# Patient Record
Sex: FEMALE | Race: WHITE | NOT HISPANIC OR LATINO | Employment: UNEMPLOYED | URBAN - NONMETROPOLITAN AREA
[De-identification: names, ages, dates, MRNs, and addresses within clinical notes are randomized per-mention and may not be internally consistent; named-entity substitution may affect disease eponyms.]

---

## 2017-07-24 PROBLEM — K21.9 ACID REFLUX: Status: ACTIVE | Noted: 2017-07-24

## 2018-01-04 PROBLEM — R10.32 LEFT LOWER QUADRANT PAIN: Status: ACTIVE | Noted: 2018-01-04

## 2018-03-01 PROBLEM — R10.32 LEFT LOWER QUADRANT PAIN: Status: RESOLVED | Noted: 2018-01-04 | Resolved: 2018-03-01

## 2019-11-22 PROBLEM — K21.9 GASTROESOPHAGEAL REFLUX DISEASE: Status: ACTIVE | Noted: 2019-11-22

## 2020-12-01 ENCOUNTER — TELEPHONE (OUTPATIENT)
Dept: INTERNAL MEDICINE | Facility: CLINIC | Age: 25
End: 2020-12-01

## 2020-12-01 NOTE — TELEPHONE ENCOUNTER
Left pt a detailed message that record is on [facility] for her to print off. Also mailed a copy to her home address.

## 2020-12-01 NOTE — TELEPHONE ENCOUNTER
PATIENT IS NEEDING A COPY OF HER VACCINATION RECORDS FOR [facility]SING [facility]OL.     CONTACT: [phone]

## 2023-04-28 ENCOUNTER — OFFICE VISIT (OUTPATIENT)
Dept: INTERNAL MEDICINE | Facility: CLINIC | Age: 28
End: 2023-04-28
Payer: COMMERCIAL

## 2023-04-28 VITALS
BODY MASS INDEX: 20.74 KG/M2 | OXYGEN SATURATION: 98 % | HEART RATE: 86 BPM | WEIGHT: 132.12 LBS | TEMPERATURE: 97.9 F | SYSTOLIC BLOOD PRESSURE: 102 MMHG | HEIGHT: 67 IN | DIASTOLIC BLOOD PRESSURE: 64 MMHG | RESPIRATION RATE: 16 BRPM

## 2023-04-28 DIAGNOSIS — N63.25 MASS OVERLAPPING MULTIPLE QUADRANTS OF LEFT BREAST: ICD-10-CM

## 2023-04-28 DIAGNOSIS — F41.9 ANXIETY: Primary | ICD-10-CM

## 2023-04-28 PROCEDURE — 99214 OFFICE O/P EST MOD 30 MIN: CPT | Performed by: FAMILY MEDICINE

## 2023-04-28 RX ORDER — LEVONORGESTREL 52 MG/1
INTRAUTERINE DEVICE INTRAUTERINE
COMMUNITY
Start: 2023-01-03

## 2023-04-28 RX ORDER — VENLAFAXINE HYDROCHLORIDE 37.5 MG/1
37.5 CAPSULE, EXTENDED RELEASE ORAL DAILY
Qty: 30 CAPSULE | Refills: 1 | Status: SHIPPED | OUTPATIENT
Start: 2023-04-28

## 2023-04-28 NOTE — PROGRESS NOTES
"Chief Complaint  Anxiety (Follow up)  Answers for HPI/ROS submitted by the patient on 4/21/2023  Please describe your symptoms.: Anxiety  Have you had these symptoms before?: Yes  How long have you been having these symptoms?: Greater than 2 weeks  Please describe any probable cause for these symptoms. : Post partum, meds werent working  What is the primary reason for your visit?: Other      Subjective        Latanya Morgan presents to [facility]  History of Present Illness  Postpartum?  Intrusive thoughts. Hates it.  Feels like a terrible person for having these thoughts.  Has tried Prozac and Lexapro and didn't like either.  Tried going back on Prozac postpartum, maybe helped a little but not where she needed to be.     Breast mass 6:00 left breast x years  Painful at times  Desires removal      Objective   Vital Signs:  /64 (BP Location: Right arm, Patient Position: Sitting, Cuff Size: Adult)   Pulse 86   Temp 97.9 °F (36.6 °C) (Temporal)   Resp 16   Ht 170.2 cm (67\")   Wt 59.9 kg (132 lb 1.9 oz)   SpO2 98%   BMI 20.69 kg/m²   Estimated body mass index is 20.69 kg/m² as calculated from the following:    Height as of this encounter: 170.2 cm (67\").    Weight as of this encounter: 59.9 kg (132 lb 1.9 oz).       BMI is within normal parameters. No other follow-up for BMI required.      Physical Exam  Vitals and nursing note reviewed.   Constitutional:       General: She is not in acute distress.     Appearance: Normal appearance. She is well-developed, well-groomed and normal weight. She is not ill-appearing, toxic-appearing or diaphoretic.   HENT:      Head: Normocephalic and atraumatic.      Right Ear: Hearing normal.      Left Ear: Hearing normal.   Eyes:      General: Lids are normal. No scleral icterus.     Extraocular Movements: Extraocular movements intact.   Neck:      Trachea: Phonation normal.   Pulmonary:      Effort: Pulmonary effort is normal. "   Chest:       Musculoskeletal:      Cervical back: Neck supple.   Skin:     Coloration: Skin is not jaundiced or pale.   Neurological:      General: No focal deficit present.      Mental Status: She is alert and oriented to person, place, and time.      Motor: Motor function is intact.   Psychiatric:         Attention and Perception: Attention and perception normal.         Mood and Affect: Affect normal. Mood is anxious.         Speech: Speech normal.         Behavior: Behavior normal. Behavior is cooperative.         Thought Content: Thought content normal.         Cognition and Memory: Cognition and memory normal.         Judgment: Judgment normal.        Result Review :  The following data was reviewed by: Juju Mcneil MD on 04/28/2023:   breast left limited (11/16/2020 13:11)                 Assessment and Plan   Diagnoses and all orders for this visit:    1. Anxiety (Primary)  -     venlafaxine XR (Effexor XR) 37.5 MG 24 hr capsule; Take 1 capsule by mouth Daily.  Dispense: 30 capsule; Refill: 1    2. Mass overlapping multiple quadrants of left breast  -     Ambulatory Referral to Breast Surgery    desires removal of mass, has had imaging previously. If imaging required prior to scheduling per breast surgery will order ultrasound.     Stop Effexor xr if any si/hi. Return to clinic in a month for recheck.           Follow Up   Return in about 4 weeks (around 5/26/2023) for follow up.  Patient was given instructions and counseling regarding her condition or for health maintenance advice. Please see specific information pulled into the AVS if appropriate.

## 2023-05-18 ENCOUNTER — TRANSCRIBE ORDERS (OUTPATIENT)
Dept: MAMMOGRAPHY | Facility: HOSPITAL | Age: 28
End: 2023-05-18
Payer: COMMERCIAL

## 2023-05-18 DIAGNOSIS — N63.24 UNSPECIFIED LUMP IN THE LEFT BREAST, LOWER INNER QUADRANT: Primary | ICD-10-CM

## 2023-05-31 ENCOUNTER — HOSPITAL ENCOUNTER (OUTPATIENT)
Dept: ULTRASOUND IMAGING | Facility: HOSPITAL | Age: 28
Discharge: HOME | End: 2023-05-31
Admitting: SURGERY

## 2023-05-31 DIAGNOSIS — N63.24 UNSPECIFIED LUMP IN THE LEFT BREAST, LOWER INNER QUADRANT: ICD-10-CM

## 2023-05-31 PROCEDURE — 76642 ULTRASOUND BREAST LIMITED: CPT

## 2023-08-27 ENCOUNTER — PATIENT MESSAGE (OUTPATIENT)
Dept: INTERNAL MEDICINE | Facility: CLINIC | Age: 28
End: 2023-08-27
Payer: COMMERCIAL

## 2023-08-28 NOTE — TELEPHONE ENCOUNTER
From: Latanya Morgan  To: Juju Mcneil  Sent: 8/27/2023 9:26 AM EDT  Subject: Canker sores    Hello, this may be a stupid question but my mouth is hurting so bad from so many canker sores at once right now. It's so bad my bottom lip is swollen. It's made it difficult to eat and even swallow my spit. I'm just wondering if there is something I am able to take to help with these whether that be to reduce the swelling or help clear them up. I get them so often and I always try to avoid acidic foods the best I can to prevent them.   Thank you   
No respiratory distress. No stridor, Lungs sounds clear with good aeration bilaterally.

## 2023-08-29 ENCOUNTER — OFFICE VISIT (OUTPATIENT)
Dept: INTERNAL MEDICINE | Facility: CLINIC | Age: 28
End: 2023-08-29
Payer: COMMERCIAL

## 2023-08-29 VITALS
BODY MASS INDEX: 19.93 KG/M2 | TEMPERATURE: 97.5 F | HEART RATE: 68 BPM | HEIGHT: 67 IN | OXYGEN SATURATION: 100 % | SYSTOLIC BLOOD PRESSURE: 100 MMHG | WEIGHT: 127 LBS | DIASTOLIC BLOOD PRESSURE: 68 MMHG

## 2023-08-29 DIAGNOSIS — K12.0 APHTHOUS ULCER OF MOUTH: Primary | ICD-10-CM

## 2023-08-29 DIAGNOSIS — K58.2 IRRITABLE BOWEL SYNDROME WITH BOTH CONSTIPATION AND DIARRHEA: ICD-10-CM

## 2023-08-29 LAB
EXPIRATION DATE: NORMAL
EXPIRATION DATE: NORMAL
FLUAV AG UPPER RESP QL IA.RAPID: NOT DETECTED
FLUBV AG UPPER RESP QL IA.RAPID: NOT DETECTED
INTERNAL CONTROL: NORMAL
INTERNAL CONTROL: NORMAL
Lab: NORMAL
Lab: NORMAL
S PYO AG THROAT QL: NEGATIVE
SARS-COV-2 AG UPPER RESP QL IA.RAPID: NOT DETECTED

## 2023-08-29 PROCEDURE — 87880 STREP A ASSAY W/OPTIC: CPT | Performed by: STUDENT IN AN ORGANIZED HEALTH CARE EDUCATION/TRAINING PROGRAM

## 2023-08-29 PROCEDURE — 99213 OFFICE O/P EST LOW 20 MIN: CPT | Performed by: STUDENT IN AN ORGANIZED HEALTH CARE EDUCATION/TRAINING PROGRAM

## 2023-08-29 PROCEDURE — 87428 SARSCOV & INF VIR A&B AG IA: CPT | Performed by: STUDENT IN AN ORGANIZED HEALTH CARE EDUCATION/TRAINING PROGRAM

## 2023-08-29 RX ORDER — PHENOL 0.6 %
1 LIQUID (ML) MUCOUS MEMBRANE AS NEEDED
Qty: 178 ML | Refills: 0 | Status: SHIPPED | OUTPATIENT
Start: 2023-08-29

## 2023-08-29 RX ORDER — MONTELUKAST SODIUM 4 MG/1
1 TABLET, CHEWABLE ORAL 2 TIMES DAILY
Qty: 180 TABLET | Refills: 1 | Status: SHIPPED | OUTPATIENT
Start: 2023-08-29

## 2023-08-29 NOTE — PROGRESS NOTES
Subjective   Latanya Morgan is a 28 y.o. female.     History of Present Illness  Patient presents with complaints of multiple mouth lesions.  States that she gets aphthous ulcers regularly.  States this has been going on since Saturday.  Denies fever chills or throat pain.  States they are painful and makes it hard to eat.  Has had them across her lips but they have dried out on the outside.  Stress levels have been very high lately.  Also has complaints of bouts of constipation and diarrhea.  States she has been having chronic watery diarrhea since having her gallbladder removed.  However states over the past week she has been very constipated.  She has been taking MiraLAX for this.  Having watery stool with no formed stools out.    The following portions of the patient's history were reviewed and updated as appropriate: allergies, current medications, past family history, past medical history, past social history, past surgical history, and problem list.    Review of Systems   All other systems reviewed and are negative.    Objective   Physical Exam  Vitals and nursing note reviewed.   Constitutional:       Appearance: Normal appearance.   HENT:      Head: Normocephalic and atraumatic.      Right Ear: External ear normal.      Left Ear: External ear normal.      Nose: Nose normal.      Mouth/Throat:      Mouth: Mucous membranes are moist.      Pharynx: Oropharynx is clear. No oropharyngeal exudate or posterior oropharyngeal erythema.      Comments: Aphthous ulcer  Eyes:      Extraocular Movements: Extraocular movements intact.      Conjunctiva/sclera: Conjunctivae normal.      Pupils: Pupils are equal, round, and reactive to light.   Cardiovascular:      Rate and Rhythm: Normal rate and regular rhythm.      Pulses: Normal pulses.      Heart sounds: Normal heart sounds.   Pulmonary:      Effort: Pulmonary effort is normal.      Breath sounds: Normal breath sounds.   Abdominal:      General: Abdomen is  flat. Bowel sounds are normal.      Palpations: Abdomen is soft.   Musculoskeletal:         General: Normal range of motion.      Cervical back: Normal range of motion.   Skin:     General: Skin is warm.      Capillary Refill: Capillary refill takes less than 2 seconds.   Neurological:      General: No focal deficit present.      Mental Status: She is alert and oriented to person, place, and time. Mental status is at baseline.   Psychiatric:         Mood and Affect: Mood normal.         Behavior: Behavior normal.         Thought Content: Thought content normal.         Judgment: Judgment normal.       Assessment & Plan   Diagnoses and all orders for this visit:    1. Aphthous ulcer of mouth (Primary)  -     phenol (Ulcerease) 0.6-1.5 % solution; Apply 1 application  to the mouth or throat As Needed for Mucositis.  Dispense: 178 mL; Refill: 0  -     POCT rapid strep A  -     POCT SARS-CoV-2 Antigen GEORGE + Flu    2. Irritable bowel syndrome with both constipation and diarrhea  -     colestipol (COLESTID) 1 g tablet; Take 1 tablet by mouth 2 (Two) Times a Day.  Dispense: 180 tablet; Refill: 1       Ulcerease prescribed since we cannot do Magic mouthwash currently due to backorder of viscous lidocaine  Strep flu and COVID-negative  Counseled to increase her Valtrex to 1 g twice a day while she has these ulcers to help with resolution  Started colestipol for irritable bowel syndrome and postcholecystectomy syndrome

## 2023-09-01 RX ORDER — VALACYCLOVIR HYDROCHLORIDE 1 G/1
2000 TABLET, FILM COATED ORAL 2 TIMES DAILY
Qty: 12 TABLET | Refills: 5 | Status: SHIPPED | OUTPATIENT
Start: 2023-09-01

## 2023-09-27 NOTE — PROGRESS NOTES
Patient: Latanya Morgan    YOB: 1995    Date: 09/28/2023    Primary Care Provider: Juju Mcneil MD    Chief Complaint   Patient presents with    Constipation       SUBJECTIVE:    History of present illness: Patient states that she has had relative constipation for at least a month.  She has had to take laxatives as well as enemas for bowel movements.  Previous to this she had been having postprandial diarrhea especially since her cholecystectomy.  Patient states that she has had some abdominal pain historically but none recently.  Of note, patient started Effexor about 2 months ago.  She has also had stool caliber changes that has become more narrow.    The following portions of the patient's history were reviewed and updated as appropriate: allergies, current medications, past family history, past medical history, past social history, past surgical history and problem list.    Review of Systems   Constitutional:  Negative for activity change, chills, fever and unexpected weight change.   HENT:  Negative for hearing loss, trouble swallowing and voice change.    Eyes:  Negative for visual disturbance.   Respiratory:  Negative for apnea, cough, chest tightness, shortness of breath and wheezing.    Cardiovascular:  Negative for chest pain, palpitations and leg swelling.   Gastrointestinal:  Positive for constipation. Negative for abdominal distention, abdominal pain, anal bleeding, blood in stool, diarrhea, nausea, rectal pain and vomiting.   Endocrine: Negative for cold intolerance and heat intolerance.   Genitourinary:  Negative for difficulty urinating, dysuria and flank pain.   Musculoskeletal:  Negative for back pain and gait problem.   Skin:  Negative for color change, rash and wound.   Neurological:  Negative for dizziness, syncope, speech difficulty, weakness, light-headedness, numbness and headaches.   Hematological:  Negative for adenopathy. Does not bruise/bleed easily.  "  Psychiatric/Behavioral:  Negative for confusion. The patient is not nervous/anxious.      History:  Past Medical History:   Diagnosis Date    Abnormal Pap smear of cervix     Anxiety     Arthritis     Body piercing     ears and nose    Depression     Dysphagia     Patient reported \"sometimes feels like things won't go down\"    GERD (gastroesophageal reflux disease)     History of fracture     Coccyx and left arm    History of Holter monitoring 2018    states was WNL    Migraine     Seasonal allergies     Tattoos     Wears glasses        Past Surgical History:   Procedure Laterality Date    ADENOIDECTOMY  2003    APPENDECTOMY       SECTION  24    CHOLECYSTECTOMY      CHOLECYSTECTOMY WITH INTRAOPERATIVE CHOLANGIOGRAM N/A 07/10/2020    Procedure: CHOLECYSTECTOMY LAPAROSCOPIC INTRAOPERATIVE CHOLANGIOGRAM;  Surgeon: Leonor Kuhn MD;  Location: [facility] OR;  Service: General;  Laterality: N/A;    DIAGNOSTIC LAPAROSCOPY N/A 2018    Procedure: DIAGNOSTIC LAPAROSCOPY WITH EXCISION OF LEFT OVARIAN CYST, LAPAROSCOPY  APPENDECTOMY;  Surgeon: Leonor Kuhn MD;  Location: [facility] OR;  Service:     ENDOSCOPY N/A 2019    Procedure: ESOPHAGOGASTRODUODENOSCOPY;  Surgeon: Leonor Kuhn MD;  Location: [facility] ENDOSCOPY;  Service: Gastroenterology    HAND SURGERY Right 2016    thumb - reported hardware remains intact    OVARIAN CYST REMOVAL      SKIN BIOPSY      moles-benign    TONSILLECTOMY         Family History   Problem Relation Age of Onset    Migraines Mother     Anxiety disorder Mother     Hypertension Maternal Grandmother     Anxiety disorder Maternal Grandmother     Arthritis Maternal Grandfather        Social History     Tobacco Use    Smoking status: Former     Packs/day: 0.25     Years: 4.00     Pack years: 1.00     Types: Cigarettes, Electronic Cigarette     Start date:      Quit date:      Years since quittin.7    Smokeless tobacco: Never   Vaping Use    Vaping Use: Former " "  Substance Use Topics    Alcohol use: Yes     Alcohol/week: 1.0 standard drink     Types: 1 Glasses of wine per week     Comment: \"couple of beers once a week\" - reported no etoh abuse    Drug use: No       Allergies:  No Known Allergies    Medications:    Current Outpatient Medications:     Mirena, 52 MG, 20 MCG/DAY intrauterine device IUD, , Disp: , Rfl:     Polyethylene Glycol powder, Use 1 dose 3 (Three) Times a Day., Disp: , Rfl:     valACYclovir (VALTREX) 500 MG tablet, Take 1 tablet by mouth., Disp: , Rfl:     venlafaxine XR (Effexor XR) 37.5 MG 24 hr capsule, Take 1 capsule by mouth Daily., Disp: 90 capsule, Rfl: 3    OBJECTIVE:    Vital Signs:   Vitals:    09/28/23 1431   BP: 100/68   Pulse: 100   Temp: 97.5 °F (36.4 °C)   SpO2: 100%   Weight: 58.3 kg (128 lb 9.6 oz)   Height: 170.2 cm (67\")       Physical Exam:   General Appearance:    Alert, cooperative, in no acute distress   Head:    Normocephalic, without obvious abnormality, atraumatic   Eyes:            Normal.  No scleral icterus.  PERRLA    Lungs:     Clear to auscultation,respirations regular, even and                  unlabored    Heart:    Regular rhythm and normal rate, normal S1 and S2, no            murmur   Abdomen:     Normal bowel sounds, no masses, no organomegaly, soft        non-tender, non-distended, no guarding,    Extremities:   Moves all extremities well, no edema, no cyanosis, no             redness   Skin:   No bleeding, bruising or rash   Neurologic:   Normal without gross deficits.   Psychiatric: No evidence of depression or anxiety        Results Review:   None    Review of Systems was reviewed and confirmed as accurate as documented by the MA.    ASSESSMENT/PLAN:    1. Constipation, unspecified constipation type        Patient with complaint of constipation.  I will order a KUB to further evaluate.  This may be a side effect to the Effexor as this is a possible side effect and began having symptoms not long after beginning " this medication.  I will have her discuss this with her primary care and possibly stop or change this medication.  Initially I would like her to attempt this prior to proceeding with further work-up which may include a colonoscopy.  After stopping the medication I would like to give it at least a week or 2 and if symptoms still continue we can consider further work-up.  She is agreeable to that plan.    Electronically signed by Donovan Reyna MD  09/28/23 09:03 EDT

## 2023-09-28 ENCOUNTER — OFFICE VISIT (OUTPATIENT)
Dept: INTERNAL MEDICINE | Facility: CLINIC | Age: 28
End: 2023-09-28
Payer: COMMERCIAL

## 2023-09-28 ENCOUNTER — OFFICE VISIT (OUTPATIENT)
Dept: SURGERY | Facility: CLINIC | Age: 28
End: 2023-09-28
Payer: COMMERCIAL

## 2023-09-28 ENCOUNTER — HOSPITAL ENCOUNTER (OUTPATIENT)
Dept: GENERAL RADIOLOGY | Facility: HOSPITAL | Age: 28
Discharge: HOME | End: 2023-09-28
Admitting: SURGERY
Payer: COMMERCIAL

## 2023-09-28 VITALS
HEART RATE: 100 BPM | OXYGEN SATURATION: 100 % | DIASTOLIC BLOOD PRESSURE: 68 MMHG | HEIGHT: 67 IN | SYSTOLIC BLOOD PRESSURE: 100 MMHG | BODY MASS INDEX: 20.18 KG/M2 | TEMPERATURE: 97.5 F | WEIGHT: 128.6 LBS

## 2023-09-28 VITALS
BODY MASS INDEX: 19.74 KG/M2 | HEIGHT: 67 IN | HEART RATE: 83 BPM | DIASTOLIC BLOOD PRESSURE: 68 MMHG | OXYGEN SATURATION: 99 % | WEIGHT: 125.8 LBS | SYSTOLIC BLOOD PRESSURE: 102 MMHG | RESPIRATION RATE: 16 BRPM | TEMPERATURE: 97.3 F

## 2023-09-28 DIAGNOSIS — R79.9 ABNORMAL BLOOD CHEMISTRY: ICD-10-CM

## 2023-09-28 DIAGNOSIS — Z11.59 NEED FOR HEPATITIS C SCREENING TEST: ICD-10-CM

## 2023-09-28 DIAGNOSIS — E53.8 VITAMIN B12 DEFICIENCY: ICD-10-CM

## 2023-09-28 DIAGNOSIS — F41.9 ANXIETY: Primary | ICD-10-CM

## 2023-09-28 DIAGNOSIS — K59.00 CONSTIPATION, UNSPECIFIED CONSTIPATION TYPE: ICD-10-CM

## 2023-09-28 DIAGNOSIS — E55.9 VITAMIN D DEFICIENCY: ICD-10-CM

## 2023-09-28 DIAGNOSIS — K59.00 CONSTIPATION, UNSPECIFIED CONSTIPATION TYPE: Primary | ICD-10-CM

## 2023-09-28 PROBLEM — Z97.5 PRESENCE OF IUD: Status: ACTIVE | Noted: 2023-03-22

## 2023-09-28 PROCEDURE — 99214 OFFICE O/P EST MOD 30 MIN: CPT | Performed by: SURGERY

## 2023-09-28 PROCEDURE — 74018 RADEX ABDOMEN 1 VIEW: CPT

## 2023-09-28 PROCEDURE — 99214 OFFICE O/P EST MOD 30 MIN: CPT | Performed by: FAMILY MEDICINE

## 2023-09-28 RX ORDER — VENLAFAXINE HYDROCHLORIDE 37.5 MG/1
37.5 CAPSULE, EXTENDED RELEASE ORAL DAILY
Qty: 90 CAPSULE | Refills: 3 | Status: SHIPPED | OUTPATIENT
Start: 2023-09-28

## 2023-09-28 RX ORDER — POLYETHYLENE GLYCOL 1450
1 POWDER (GRAM) MISCELLANEOUS 3 TIMES DAILY
COMMUNITY

## 2023-09-28 NOTE — PROGRESS NOTES
"Chief Complaint  Anxiety (Follow up on medication )    Subjective        Latanya Morgan presents to [facility]  History of Present Illness  Takes Effexor at night as it makes her tired. Med seems to help with mood.     \"Super\" constipated. Has tried soap suds enema, Miralax. Stool softeners. Has had for over a month. Going to general surgery today to discuss colonoscopy. Hasn't felt \"right\" since having her baby. History of vitamin deficiencies.     Objective   Vital Signs:  /68 (BP Location: Left arm, Patient Position: Sitting, Cuff Size: Adult)   Pulse 83   Temp 97.3 °F (36.3 °C) (Temporal)   Resp 16   Ht 170.2 cm (67\")   Wt 57.1 kg (125 lb 12.8 oz)   SpO2 99%   BMI 19.70 kg/m²   Estimated body mass index is 19.7 kg/m² as calculated from the following:    Height as of this encounter: 170.2 cm (67\").    Weight as of this encounter: 57.1 kg (125 lb 12.8 oz).       BMI is within normal parameters. No other follow-up for BMI required.      Physical Exam  Vitals and nursing note reviewed.   Constitutional:       General: She is not in acute distress.     Appearance: Normal appearance. She is well-developed, well-groomed and normal weight. She is not ill-appearing, toxic-appearing or diaphoretic.   HENT:      Head: Normocephalic and atraumatic.      Right Ear: Hearing normal.      Left Ear: Hearing normal.   Eyes:      General: Lids are normal. No scleral icterus.     Extraocular Movements: Extraocular movements intact.   Neck:      Trachea: Phonation normal.   Pulmonary:      Effort: Pulmonary effort is normal.   Musculoskeletal:      Cervical back: Neck supple.   Skin:     Coloration: Skin is not jaundiced or pale.   Neurological:      General: No focal deficit present.      Mental Status: She is alert and oriented to person, place, and time.      Motor: Motor function is intact.   Psychiatric:         Attention and Perception: Attention and perception normal.       "   Mood and Affect: Mood and affect normal.         Speech: Speech normal.         Behavior: Behavior normal. Behavior is cooperative.         Thought Content: Thought content normal.         Cognition and Memory: Cognition and memory normal.         Judgment: Judgment normal.      Result Review :                   Assessment and Plan   Diagnoses and all orders for this visit:    1. Anxiety (Primary)  -     venlafaxine XR (Effexor XR) 37.5 MG 24 hr capsule; Take 1 capsule by mouth Daily.  Dispense: 90 capsule; Refill: 3  -     TSH+Free T4    2. Constipation, unspecified constipation type  Comments:  see gen surg as scheduled, screening thyroid level today, may need to try Linzess (would start with 145 mcg)  Orders:  -     TSH+Free T4    3. Vitamin B12 deficiency  -     CBC (No Diff)  -     Vitamin B12 & Folate  -     Vitamin B6    4. Vitamin D deficiency  -     Vitamin D,25-Hydroxy    5. Abnormal blood chemistry  -     TSH+Free T4  -     CBC (No Diff)  -     Comprehensive Metabolic Panel  -     Vitamin B12 & Folate  -     Vitamin B6  -     Vitamin D,25-Hydroxy    6. Need for hepatitis C screening test  Comments:  reviewed Care Everywhere, I do not see a Hep C screen with her other prenatal labs  Orders:  -     Hepatitis C antibody             Follow Up   Return in about 18 weeks (around 2/1/2024) for Annual physical.  Patient was given instructions and counseling regarding her condition or for health maintenance advice. Please see specific information pulled into the AVS if appropriate.

## 2023-09-29 ENCOUNTER — TELEPHONE (OUTPATIENT)
Dept: SURGERY | Facility: CLINIC | Age: 28
End: 2023-09-29
Payer: COMMERCIAL

## 2023-09-29 LAB
25(OH)D3+25(OH)D2 SERPL-MCNC: 36.5 NG/ML (ref 30–100)
ALBUMIN SERPL-MCNC: 5.4 G/DL (ref 3.5–5.2)
ALBUMIN/GLOB SERPL: 2.5 G/DL
ALP SERPL-CCNC: 70 U/L (ref 39–117)
ALT SERPL-CCNC: 12 U/L (ref 1–33)
AST SERPL-CCNC: 17 U/L (ref 1–32)
BILIRUB SERPL-MCNC: 0.9 MG/DL (ref 0–1.2)
BUN SERPL-MCNC: 9 MG/DL (ref 6–20)
BUN/CREAT SERPL: 10.3 (ref 7–25)
CALCIUM SERPL-MCNC: 10.1 MG/DL (ref 8.6–10.5)
CHLORIDE SERPL-SCNC: 104 MMOL/L (ref 98–107)
CO2 SERPL-SCNC: 26.7 MMOL/L (ref 22–29)
CREAT SERPL-MCNC: 0.87 MG/DL (ref 0.57–1)
EGFRCR SERPLBLD CKD-EPI 2021: 94.4 ML/MIN/1.73
ERYTHROCYTE [DISTWIDTH] IN BLOOD BY AUTOMATED COUNT: 12.4 % (ref 12.3–15.4)
FOLATE SERPL-MCNC: 5.95 NG/ML (ref 4.78–24.2)
GLOBULIN SER CALC-MCNC: 2.2 GM/DL
GLUCOSE SERPL-MCNC: 75 MG/DL (ref 65–99)
HCT VFR BLD AUTO: 42.7 % (ref 34–46.6)
HCV IGG SERPL QL IA: NON REACTIVE
HGB BLD-MCNC: 14.3 G/DL (ref 12–15.9)
MCH RBC QN AUTO: 29.7 PG (ref 26.6–33)
MCHC RBC AUTO-ENTMCNC: 33.5 G/DL (ref 31.5–35.7)
MCV RBC AUTO: 88.6 FL (ref 79–97)
PLATELET # BLD AUTO: 316 10*3/MM3 (ref 140–450)
POTASSIUM SERPL-SCNC: 4.5 MMOL/L (ref 3.5–5.2)
PROT SERPL-MCNC: 7.6 G/DL (ref 6–8.5)
PYRIDOXAL PHOS SERPL-MCNC: NORMAL UG/L
RBC # BLD AUTO: 4.82 10*6/MM3 (ref 3.77–5.28)
SODIUM SERPL-SCNC: 140 MMOL/L (ref 136–145)
T4 FREE SERPL-MCNC: 1.01 NG/DL (ref 0.93–1.7)
TSH SERPL DL<=0.005 MIU/L-ACNC: 3.8 UIU/ML (ref 0.27–4.2)
VIT B12 SERPL-MCNC: 434 PG/ML (ref 211–946)
WBC # BLD AUTO: 4.17 10*3/MM3 (ref 3.4–10.8)

## 2023-10-04 ENCOUNTER — TELEPHONE (OUTPATIENT)
Dept: SURGERY | Facility: CLINIC | Age: 28
End: 2023-10-04
Payer: COMMERCIAL

## 2023-10-04 LAB
25(OH)D3+25(OH)D2 SERPL-MCNC: 36.5 NG/ML (ref 30–100)
ALBUMIN SERPL-MCNC: 5.4 G/DL (ref 3.5–5.2)
ALBUMIN/GLOB SERPL: 2.5 G/DL
ALP SERPL-CCNC: 70 U/L (ref 39–117)
ALT SERPL-CCNC: 12 U/L (ref 1–33)
AST SERPL-CCNC: 17 U/L (ref 1–32)
BILIRUB SERPL-MCNC: 0.9 MG/DL (ref 0–1.2)
BUN SERPL-MCNC: 9 MG/DL (ref 6–20)
BUN/CREAT SERPL: 10.3 (ref 7–25)
CALCIUM SERPL-MCNC: 10.1 MG/DL (ref 8.6–10.5)
CHLORIDE SERPL-SCNC: 104 MMOL/L (ref 98–107)
CO2 SERPL-SCNC: 26.7 MMOL/L (ref 22–29)
CREAT SERPL-MCNC: 0.87 MG/DL (ref 0.57–1)
EGFRCR SERPLBLD CKD-EPI 2021: 94.4 ML/MIN/1.73
ERYTHROCYTE [DISTWIDTH] IN BLOOD BY AUTOMATED COUNT: 12.4 % (ref 12.3–15.4)
FOLATE SERPL-MCNC: 5.95 NG/ML (ref 4.78–24.2)
GLOBULIN SER CALC-MCNC: 2.2 GM/DL
GLUCOSE SERPL-MCNC: 75 MG/DL (ref 65–99)
HCT VFR BLD AUTO: 42.7 % (ref 34–46.6)
HCV IGG SERPL QL IA: NON REACTIVE
HGB BLD-MCNC: 14.3 G/DL (ref 12–15.9)
MCH RBC QN AUTO: 29.7 PG (ref 26.6–33)
MCHC RBC AUTO-ENTMCNC: 33.5 G/DL (ref 31.5–35.7)
MCV RBC AUTO: 88.6 FL (ref 79–97)
PLATELET # BLD AUTO: 316 10*3/MM3 (ref 140–450)
POTASSIUM SERPL-SCNC: 4.5 MMOL/L (ref 3.5–5.2)
PROT SERPL-MCNC: 7.6 G/DL (ref 6–8.5)
PYRIDOXAL PHOS SERPL-MCNC: 12.5 UG/L (ref 3.4–65.2)
RBC # BLD AUTO: 4.82 10*6/MM3 (ref 3.77–5.28)
SODIUM SERPL-SCNC: 140 MMOL/L (ref 136–145)
T4 FREE SERPL-MCNC: 1.01 NG/DL (ref 0.93–1.7)
TSH SERPL DL<=0.005 MIU/L-ACNC: 3.8 UIU/ML (ref 0.27–4.2)
VIT B12 SERPL-MCNC: 434 PG/ML (ref 211–946)
WBC # BLD AUTO: 4.17 10*3/MM3 (ref 3.4–10.8)

## 2023-10-04 NOTE — TELEPHONE ENCOUNTER
I spoke to the patient to let her know her xray did not show a significant amount of constipation. I let her know dr smalls recommends stopping her anxiety medication to see if that helps her constipation. I also let her know to take miralax daily.

## 2023-10-08 DIAGNOSIS — F41.9 ANXIETY: ICD-10-CM

## 2023-10-09 RX ORDER — VENLAFAXINE HYDROCHLORIDE 37.5 MG/1
37.5 CAPSULE, EXTENDED RELEASE ORAL DAILY
Qty: 30 CAPSULE | Refills: 1 | OUTPATIENT
Start: 2023-10-09

## 2023-10-11 ENCOUNTER — TELEMEDICINE (OUTPATIENT)
Dept: INTERNAL MEDICINE | Facility: CLINIC | Age: 28
End: 2023-10-11
Payer: COMMERCIAL

## 2023-10-11 ENCOUNTER — TELEPHONE (OUTPATIENT)
Dept: INTERNAL MEDICINE | Facility: CLINIC | Age: 28
End: 2023-10-11

## 2023-10-11 DIAGNOSIS — F41.9 ANXIETY: Primary | ICD-10-CM

## 2023-10-11 DIAGNOSIS — K59.03 DRUG-INDUCED CONSTIPATION: ICD-10-CM

## 2023-10-11 PROCEDURE — 99214 OFFICE O/P EST MOD 30 MIN: CPT | Performed by: NURSE PRACTITIONER

## 2023-10-11 RX ORDER — DESVENLAFAXINE 25 MG/1
25 TABLET, EXTENDED RELEASE ORAL DAILY
Qty: 90 TABLET | Refills: 0 | Status: SHIPPED | OUTPATIENT
Start: 2023-10-11

## 2023-10-11 NOTE — PROGRESS NOTES
You have chosen to receive care through a telehealth visit.  Do you consent to use a video/audio connection for your medical care today? Yes  Active parties: Nohemy OCASIO, Rosita Levine CMA, Latanya Morgan

## 2023-10-11 NOTE — PROGRESS NOTES
Mode of Visit: Video  Location of patient: home  You have chosen to receive care through a telehealth visit.  Does the patient consent to use a video/audio connection for your medical care today? Yes  The visit included audio and video interaction. No technical issues occurred during this visit.    Date: 10/11/2023  Name: Latanya Morgan  : 1995         Chief Complaint:   Chief Complaint   Patient presents with    Anxiety     Discuss medication         HPI:  Latanya Morgan is a 28 y.o. female presents for video visit in regard to anxiety.  She has most recently been taking venlafaxine.  Became constipated.  KUB showed constipation; advised to stop taking venlafaxine as constipation is a known side effect.  Constipation has resolved since then.  Anxiety has increased since discontinuing the medication, as one would expect.  She has taken Wellbutrin, BuSpar, Lexapro, Prozac, hydroxyzine in the past with either suboptimal response or adverse effects. Denies depressed mood, anhedonia, insomnia, hypersomnia, fatigue, feelings of worthlessness, difficulty concentrating, impaired memory, SI, HI, panic attacks, weight change.      History: The following portions of the patient's history were reviewed and updated as appropriate: allergies, current medications, past medical history, family history, surgical history, social history and problem list.          PE:  Physical Exam   Constitutional: She appears well-developed and well-nourished. No distress.   HENT:   Head: Normocephalic.   Right Ear: External ear normal.   Left Ear: External ear normal.   Eyes: Pupils are equal, round, and reactive to light. Conjunctivae are normal.   Neck: Neck normal appearance.  Pulmonary/Chest: Effort normal.   Abdominal: She exhibits no distension. There is no abdominal tenderness.   Neurological: She is alert.   Oriented x 3   Skin: No pallor.   Psychiatric: She has a normal mood and affect. Her speech is normal and  behavior is normal. Thought content normal. Her affect is normal.          Assessment/Plan:  Diagnoses and all orders for this visit:    1. Anxiety (Primary)  -     Desvenlafaxine Succinate ER 25 MG tablet sustained-release 24 hour; Take 1 tablet by mouth Daily.  Dispense: 90 tablet; Refill: 0  - Monitor for side effects, stop taking should constipation develop.  - Discussed [employer] testing.  Patient will visit website to see if this is an affordable option for her if desvenlafaxine is ineffective or has significant side effects        - Encouraged to take part in daily physical exercise.          - Eat healthy, well balanced diet; avoid sugary foods or beverages        - Abstain from alcohol and drugs        - Ensure good night's sleep by creating calm space in bedroom, avoiding screen time 1-2 hours before bed, no caffeine after 5 pm        - Talk to supportive family and friends, as needed        - Consider journaling, other creative way to express feelings, if needed    2. Drug-induced constipation        - Stay well-hydrated.  Be physically active every day.  Consider MiraLAX, prunes, stool softener daily as needed for constipation.        Return in about 6 weeks (around 11/22/2023) for  Annual w/Dr Mcneil.  Patient was given instructions and counseling regarding her condition or for health maintenance advice. Please see specific information pulled into the AVS if appropriate.

## 2024-01-22 NOTE — PROGRESS NOTES
Patient: Latanya Morgan    YOB: 1995    Date: 01/25/2024    Primary Care Provider: Juju Mcneil MD    Chief Complaint   Patient presents with    Mass     Left breast       SUBJECTIVE:    History of present illness:  I saw the patient in the office  today for evaluation and treatment of lump in left breast.  Patient states that she has had this for several years.  She thinks it has grown slightly and is more painful.  She wishes to have this removed.    The following portions of the patient's history were reviewed and updated as appropriate: allergies, current medications, past family history, past medical history, past social history, past surgical history and problem list.    Review of Systems   Constitutional:  Negative for chills, fever and unexpected weight change.   HENT:  Negative for hearing loss, trouble swallowing and voice change.    Eyes:  Negative for visual disturbance.   Respiratory:  Negative for apnea, cough, chest tightness, shortness of breath and wheezing.    Cardiovascular:  Negative for chest pain, palpitations and leg swelling.   Gastrointestinal:  Negative for abdominal distention, abdominal pain, anal bleeding, blood in stool, constipation, diarrhea, nausea, rectal pain and vomiting.   Endocrine: Negative for cold intolerance and heat intolerance.   Genitourinary:  Negative for difficulty urinating, dysuria and flank pain.   Musculoskeletal:  Negative for back pain and gait problem.   Skin:  Negative for color change, rash and wound.   Neurological:  Negative for dizziness, syncope, speech difficulty, weakness, light-headedness, numbness and headaches.   Hematological:  Negative for adenopathy. Does not bruise/bleed easily.   Psychiatric/Behavioral:  Negative for confusion. The patient is not nervous/anxious.        History:  Past Medical History:   Diagnosis Date    Abnormal Pap smear of cervix     Anxiety     Arthritis     Body piercing     ears and nose     "Depression     Dysphagia     Patient reported \"sometimes feels like things won't go down\"    GERD (gastroesophageal reflux disease)     History of fracture     Coccyx and left arm    History of Holter monitoring 2018    states was WNL    Migraine     Seasonal allergies     Tattoos     Wears glasses           Past Surgical History:   Procedure Laterality Date    ADENOIDECTOMY  2003    APPENDECTOMY       SECTION  22    CHOLECYSTECTOMY      CHOLECYSTECTOMY WITH INTRAOPERATIVE CHOLANGIOGRAM N/A 07/10/2020    Procedure: CHOLECYSTECTOMY LAPAROSCOPIC INTRAOPERATIVE CHOLANGIOGRAM;  Surgeon: Leonor Kuhn MD;  Location: [facility] OR;  Service: General;  Laterality: N/A;    DIAGNOSTIC LAPAROSCOPY N/A 2018    Procedure: DIAGNOSTIC LAPAROSCOPY WITH EXCISION OF LEFT OVARIAN CYST, LAPAROSCOPY  APPENDECTOMY;  Surgeon: Leonor Kuhn MD;  Location: [facility] OR;  Service:     ENDOSCOPY N/A 2019    Procedure: ESOPHAGOGASTRODUODENOSCOPY;  Surgeon: Leonor Kuhn MD;  Location: [facility];  Service: Gastroenterology    HAND SURGERY Right 2016    thumb - reported hardware remains intact    OVARIAN CYST REMOVAL      SKIN BIOPSY      moles-benign    TONSILLECTOMY         Family History   Problem Relation Age of Onset    Migraines Mother     Anxiety disorder Mother     Hypertension Maternal Grandmother     Anxiety disorder Maternal Grandmother     Arthritis Maternal Grandfather        Social History     Tobacco Use    Smoking status: Former     Packs/day: 0.25     Years: 4.00     Additional pack years: 0.00     Total pack years: 1.00     Types: Cigarettes, Electronic Cigarette     Start date:      Quit date:      Years since quitting: 3.0    Smokeless tobacco: Never   Vaping Use    Vaping Use: Former   Substance Use Topics    Alcohol use: Yes     Alcohol/week: 1.0 standard drink of alcohol     Types: 1 Glasses of wine per week     Comment: \"couple of beers once a week\" - reported no etoh abuse    " "Drug use: No       Allergies:  No Known Allergies    Medications:    Current Outpatient Medications:     Mirena, 52 MG, 20 MCG/DAY intrauterine device IUD, , Disp: , Rfl:     Desvenlafaxine Succinate ER 25 MG tablet sustained-release 24 hour, Take 1 tablet by mouth Daily., Disp: 90 tablet, Rfl: 0    Polyethylene Glycol powder, Use 1 dose 3 (Three) Times a Day. (Patient not taking: Reported on 1/25/2024), Disp: , Rfl:     valACYclovir (VALTREX) 500 MG tablet, Take 1 tablet by mouth. (Patient not taking: Reported on 1/25/2024), Disp: , Rfl:     OBJECTIVE:    Vital Signs:   Vitals:    01/25/24 1421   BP: 118/64   Pulse: 90   Resp: 18   Temp: 97.5 °F (36.4 °C)   TempSrc: Temporal   SpO2: 99%   Weight: 56.4 kg (124 lb 6.4 oz)   Height: 170.2 cm (67\")       Physical Exam:   General Appearance:    Alert, cooperative, in no acute distress   Head:    Normocephalic, without obvious abnormality, atraumatic   Eyes:            Normal.  No scleral icterus.  PERRLA    Lungs:     Clear to auscultation,respirations regular, even and                  unlabored    Heart:    Regular rhythm and normal rate, normal S1 and S2, no            murmur   Extremities:   Moves all extremities well, no edema, no cyanosis, no             redness   Skin:   No bleeding, bruising or rash   Neurologic:   Normal without gross deficits.   Psychiatric: No evidence of depression or anxiety   Breast exam: Right breast is normal.  Left breast with a 2 cm palpable mass at the 7 o'clock position.  Quite posterior against the chest wall       Results Review:   I reviewed the patient's new clinical results.  Ultrasound was reviewed    Review of Systems was reviewed and confirmed as accurate as documented by the MA.    ASSESSMENT/PLAN:    1. Mass of left breast, unspecified quadrant        Possible fibroadenoma however there is question as to whether or not this masses in the breast or in the pectoralis muscle.  Either way it is causing her symptoms as it is " quite tender and she wishes to have this removed.  I think this is very reasonable.  She understands the procedure as well as the risk of bleeding and infection and she wishes to proceed.  I do not think she requires further imaging such as MRI since I will be able to remove this even if it is in the muscle.  She is agreeable.    Electronically signed by Marleny Aguilar MA  01/25/24

## 2024-01-25 ENCOUNTER — OFFICE VISIT (OUTPATIENT)
Dept: SURGERY | Facility: CLINIC | Age: 29
End: 2024-01-25
Payer: COMMERCIAL

## 2024-01-25 VITALS
WEIGHT: 124.4 LBS | BODY MASS INDEX: 19.53 KG/M2 | SYSTOLIC BLOOD PRESSURE: 118 MMHG | OXYGEN SATURATION: 99 % | DIASTOLIC BLOOD PRESSURE: 64 MMHG | RESPIRATION RATE: 18 BRPM | TEMPERATURE: 97.5 F | HEART RATE: 90 BPM | HEIGHT: 67 IN

## 2024-01-25 DIAGNOSIS — N63.20 MASS OF LEFT BREAST, UNSPECIFIED QUADRANT: Primary | ICD-10-CM

## 2024-01-25 PROCEDURE — 99214 OFFICE O/P EST MOD 30 MIN: CPT | Performed by: SURGERY

## 2024-02-01 ENCOUNTER — PATIENT MESSAGE (OUTPATIENT)
Dept: INTERNAL MEDICINE | Facility: CLINIC | Age: 29
End: 2024-02-01

## 2024-02-01 ENCOUNTER — OFFICE VISIT (OUTPATIENT)
Dept: INTERNAL MEDICINE | Facility: CLINIC | Age: 29
End: 2024-02-01
Payer: COMMERCIAL

## 2024-02-01 VITALS
WEIGHT: 125.8 LBS | HEIGHT: 67 IN | SYSTOLIC BLOOD PRESSURE: 110 MMHG | TEMPERATURE: 97.3 F | BODY MASS INDEX: 19.74 KG/M2 | HEART RATE: 98 BPM | OXYGEN SATURATION: 100 % | RESPIRATION RATE: 16 BRPM | DIASTOLIC BLOOD PRESSURE: 68 MMHG

## 2024-02-01 DIAGNOSIS — M25.511 CHRONIC PAIN OF BOTH SHOULDERS: ICD-10-CM

## 2024-02-01 DIAGNOSIS — K58.2 IRRITABLE BOWEL SYNDROME WITH BOTH CONSTIPATION AND DIARRHEA: ICD-10-CM

## 2024-02-01 DIAGNOSIS — M25.512 CHRONIC PAIN OF BOTH SHOULDERS: ICD-10-CM

## 2024-02-01 DIAGNOSIS — R41.840 ATTENTION DEFICIT: ICD-10-CM

## 2024-02-01 DIAGNOSIS — Z00.00 ANNUAL PHYSICAL EXAM: Primary | ICD-10-CM

## 2024-02-01 DIAGNOSIS — F50.81 BINGE EATING DISORDER: ICD-10-CM

## 2024-02-01 DIAGNOSIS — G89.29 CHRONIC PAIN OF BOTH SHOULDERS: ICD-10-CM

## 2024-02-01 PROCEDURE — 99395 PREV VISIT EST AGE 18-39: CPT | Performed by: FAMILY MEDICINE

## 2024-02-01 RX ORDER — LISDEXAMFETAMINE DIMESYLATE CAPSULES 30 MG/1
30 CAPSULE ORAL EVERY MORNING
Qty: 30 CAPSULE | Refills: 0 | Status: SHIPPED | OUTPATIENT
Start: 2024-02-01 | End: 2024-02-01 | Stop reason: SDUPTHER

## 2024-02-01 RX ORDER — LISDEXAMFETAMINE DIMESYLATE CAPSULES 30 MG/1
30 CAPSULE ORAL EVERY MORNING
Qty: 30 CAPSULE | Refills: 0 | Status: SHIPPED | OUTPATIENT
Start: 2024-02-01 | End: 2024-02-05 | Stop reason: SDUPTHER

## 2024-02-01 NOTE — PATIENT INSTRUCTIONS
Health Maintenance, Female  Adopting a healthy lifestyle and getting preventive care can go a long way to promote health and wellness. Talk with your health care provider about what schedule of regular examinations is right for you. This is a good chance for you to check in with your provider about disease prevention and staying healthy.  In between checkups, there are plenty of things you can do on your own. Experts have done a lot of research about which lifestyle changes and preventive measures are most likely to keep you healthy. Ask your health care provider for more information.  Weight and diet  Eat a healthy diet  Be sure to include plenty of vegetables, fruits, low-fat dairy products, and lean protein.  Do not eat a lot of foods high in solid fats, added sugars, or salt.  Get regular exercise. This is one of the most important things you can do for your health.  Most adults should exercise for at least 150 minutes each week. The exercise should increase your heart rate and make you sweat (moderate-intensity exercise).  Most adults should also do strengthening exercises at least twice a week. This is in addition to the moderate-intensity exercise.     Maintain a healthy weight  Body mass index (BMI) is a measurement that can be used to identify possible weight problems. It estimates body fat based on height and weight. Your health care provider can help determine your BMI and help you achieve or maintain a healthy weight.  For females 20 years of age and older:  A BMI below 18.5 is considered underweight.  A BMI of 18.5 to 24.9 is normal.  A BMI of 25 to 29.9 is considered overweight.  A BMI of 30 and above is considered obese.     Watch levels of cholesterol and blood lipids  You should start having your blood tested for lipids and cholesterol at 20 years of age, then have this test every 5 years.  You may need to have your cholesterol levels checked more often if:  Your lipid or cholesterol levels are  high.  You are older than 50 years of age.  You are at high risk for heart disease.     Cancer screening  Lung Cancer  Lung cancer screening is recommended for adults 55-80 years old who are at high risk for lung cancer because of a history of smoking.  A yearly low-dose CT scan of the lungs is recommended for people who:  Currently smoke.  Have quit within the past 15 years.  Have at least a 30-pack-year history of smoking. A pack year is smoking an average of one pack of cigarettes a day for 1 year.  Yearly screening should continue until it has been 15 years since you quit.  Yearly screening should stop if you develop a health problem that would prevent you from having lung cancer treatment.     Breast Cancer  Practice breast self-awareness. This means understanding how your breasts normally appear and feel.  It also means doing regular breast self-exams. Let your health care provider know about any changes, no matter how small.  If you are in your 20s or 30s, you should have a clinical breast exam (CBE) by a health care provider every 1-3 years as part of a regular health exam.  If you are 40 or older, have a CBE every year. Also consider having a breast X-ray (mammogram) every year.  If you have a family history of breast cancer, talk to your health care provider about genetic screening.  If you are at high risk for breast cancer, talk to your health care provider about having an MRI and a mammogram every year.  Breast cancer gene (BRCA) assessment is recommended for women who have family members with BRCA-related cancers. BRCA-related cancers include:  Breast.  Ovarian.  Tubal.  Peritoneal cancers.  Results of the assessment will determine the need for genetic counseling and BRCA1 and BRCA2 testing.     Cervical Cancer  Your health care provider may recommend that you be screened regularly for cancer of the pelvic organs (ovaries, uterus, and vagina). This screening involves a pelvic examination, including  checking for microscopic changes to the surface of your cervix (Pap test). You may be encouraged to have this screening done every 3 years, beginning at age 21.  For women ages 30-65, health care providers may recommend pelvic exams and Pap testing every 3 years, or they may recommend the Pap and pelvic exam, combined with testing for human papilloma virus (HPV), every 5 years. Some types of HPV increase your risk of cervical cancer. Testing for HPV may also be done on women of any age with unclear Pap test results.  Other health care providers may not recommend any screening for nonpregnant women who are considered low risk for pelvic cancer and who do not have symptoms. Ask your health care provider if a screening pelvic exam is right for you.  If you have had past treatment for cervical cancer or a condition that could lead to cancer, you need Pap tests and screening for cancer for at least 20 years after your treatment. If Pap tests have been discontinued, your risk factors (such as having a new sexual partner) need to be reassessed to determine if screening should resume. Some women have medical problems that increase the chance of getting cervical cancer. In these cases, your health care provider may recommend more frequent screening and Pap tests.     Colorectal Cancer  This type of cancer can be detected and often prevented.  Routine colorectal cancer screening usually begins at 50 years of age and continues through 75 years of age.  Your health care provider may recommend screening at an earlier age if you have risk factors for colon cancer.  Your health care provider may also recommend using home test kits to check for hidden blood in the stool.  A small camera at the end of a tube can be used to examine your colon directly (sigmoidoscopy or colonoscopy). This is done to check for the earliest forms of colorectal cancer.  Routine screening usually begins at age 50.  Direct examination of the colon should  be repeated every 5-10 years through 75 years of age. However, you may need to be screened more often if early forms of precancerous polyps or small growths are found.     Skin Cancer  Check your skin from head to toe regularly.  Tell your health care provider about any new moles or changes in moles, especially if there is a change in a mole's shape or color.  Also tell your health care provider if you have a mole that is larger than the size of a pencil eraser.  Always use sunscreen. Apply sunscreen liberally and repeatedly throughout the day.  Protect yourself by wearing long sleeves, pants, a wide-brimmed hat, and sunglasses whenever you are outside.     Heart disease, diabetes, and high blood pressure  High blood pressure causes heart disease and increases the risk of stroke. High blood pressure is more likely to develop in:  People who have blood pressure in the high end of the normal range (130-139/85-89 mm Hg).  People who are overweight or obese.  People who are .  If you are 18-39 years of age, have your blood pressure checked every 3-5 years. If you are 40 years of age or older, have your blood pressure checked every year. You should have your blood pressure measured twice--once when you are at a hospital or clinic, and once when you are not at a hospital or clinic. Record the average of the two measurements. To check your blood pressure when you are not at a hospital or clinic, you can use:  An automated blood pressure machine at a pharmacy.  A home blood pressure monitor.  If you are between 55 years and 79 years old, ask your health care provider if you should take aspirin to prevent strokes.  Have regular diabetes screenings. This involves taking a blood sample to check your fasting blood sugar level.  If you are at a normal weight and have a low risk for diabetes, have this test once every three years after 45 years of age.  If you are overweight and have a high risk for diabetes,  consider being tested at a younger age or more often.  Preventing infection  Hepatitis B  If you have a higher risk for hepatitis B, you should be screened for this virus. You are considered at high risk for hepatitis B if:  You were born in a country where hepatitis B is common. Ask your health care provider which countries are considered high risk.  Your parents were born in a high-risk country, and you have not been immunized against hepatitis B (hepatitis B vaccine).  You have HIV or AIDS.  You use needles to inject street drugs.  You live with someone who has hepatitis B.  You have had sex with someone who has hepatitis B.  You get hemodialysis treatment.  You take certain medicines for conditions, including cancer, organ transplantation, and autoimmune conditions.     Hepatitis C  Blood testing is recommended for:  Everyone born from 1945 through 1965.  Anyone with known risk factors for hepatitis C.     Sexually transmitted infections (STIs)  You should be screened for sexually transmitted infections (STIs) including gonorrhea and chlamydia if:  You are sexually active and are younger than 24 years of age.  You are older than 24 years of age and your health care provider tells you that you are at risk for this type of infection.  Your sexual activity has changed since you were last screened and you are at an increased risk for chlamydia or gonorrhea. Ask your health care provider if you are at risk.  If you do not have HIV, but are at risk, it may be recommended that you take a prescription medicine daily to prevent HIV infection. This is called pre-exposure prophylaxis (PrEP). You are considered at risk if:  You are sexually active and do not regularly use condoms or know the HIV status of your partner(s).  You take drugs by injection.  You are sexually active with a partner who has HIV.     Talk with your health care provider about whether you are at high risk of being infected with HIV. If you choose to  begin PrEP, you should first be tested for HIV. You should then be tested every 3 months for as long as you are taking PrEP.  Pregnancy  If you are premenopausal and you may become pregnant, ask your health care provider about preconception counseling.  If you may become pregnant, take 400 to 800 micrograms (mcg) of folic acid every day.  If you want to prevent pregnancy, talk to your health care provider about birth control (contraception).  Osteoporosis and menopause  Osteoporosis is a disease in which the bones lose minerals and strength with aging. This can result in serious bone fractures. Your risk for osteoporosis can be identified using a bone density scan.  If you are 65 years of age or older, or if you are at risk for osteoporosis and fractures, ask your health care provider if you should be screened.  Ask your health care provider whether you should take a calcium or vitamin D supplement to lower your risk for osteoporosis.  Menopause may have certain physical symptoms and risks.  Hormone replacement therapy may reduce some of these symptoms and risks.  Talk to your health care provider about whether hormone replacement therapy is right for you.  Follow these instructions at home:  Schedule regular health, dental, and eye exams.  Stay current with your immunizations.  Do not use any tobacco products including cigarettes, chewing tobacco, or electronic cigarettes.  If you are pregnant, do not drink alcohol.  If you are breastfeeding, limit how much and how often you drink alcohol.  Limit alcohol intake to no more than 1 drink per day for nonpregnant women. One drink equals 12 ounces of beer, 5 ounces of wine, or 1½ ounces of hard liquor.  Do not use street drugs.  Do not share needles.  Ask your health care provider for help if you need support or information about quitting drugs.  Tell your health care provider if you often feel depressed.  Tell your health care provider if you have ever been abused or do  not feel safe at home.  This information is not intended to replace advice given to you by your health care provider. Make sure you discuss any questions you have with your health care provider.  Document Released: 07/02/2012 Document Revised: 05/25/2017 Document Reviewed: 09/20/2016  [employer] Interactive Patient Education © 2018 [employer]    Cervical Cancer Screening--the PAP test     What is cervical cancer screening?  Cervical cancer screening is used to find changes in the cells of the cervix that could lead to cancer. The cervix is the opening to the uterus and is located at the top of the vagina. Screening includes cervical cytology (also called the Pap test or Pap smear) and, for some women, testing for human papillomavirus (HPV).    How does cervical cancer occur?  Cancer occurs when cervical cells become abnormal and, over time, grow out of control. The cancer cells invade deeper into the cervical tissue. In advanced cases, cancer cells can spread to other organs of the body.    What causes cervical cancer?  Most cases of cervical cancer are caused by infection with HPV. HPV is a virus that enters cells and can cause them to change. Some types of HPV have been linked to cervical cancer as well as cancer of the vulva, vagina, penis, anus, mouth, and throat. Types of HPV that may cause cancer are known as “high-risk types.”  HPV is passed from person to person during sexual activity. It is very common, and most people who are sexually active will get an HPV infection in their lifetime. HPV infection often causes no symptoms. Most HPV infections go away on their own. These short-term infections typically cause only mild (“low-grade”) changes in cervical cells. The cells go back to normal as the HPV infection clears. But in some women, HPV does not go away. If a high-risk type of HPV infection lasts for a long time, it can cause more severe (“high-grade”) changes in cervical cells. High-grade changes are  more likely to lead to cancer.    Why is cervical cancer screening important?  It usually takes 3-7 years for high-grade changes in cervical cells to become cancer. Cervical cancer screening may detect these changes before they become cancer. Women with low-grade changes can be tested more frequently to see if their cells go back to normal. Women with high-grade changes can get treatment to have the cells removed.    How is cervical cancer screening done?  Cervical cancer screening includes the Pap test and, for some women, an HPV test. Both tests use cells taken from the cervix. The screening process is simple and fast. You lie on an exam table and a speculum is used to open the vagina. The speculum gives a clear view of the cervix and upper vagina. Cells are removed from the cervix with a brush or other sampling instrument. The cells usually are put into a special liquid and sent to a laboratory for testing:   For a Pap test, the sample is examined to see if abnormal cells are present.   For an HPV test, the sample is tested for the presence of 13-14 of the most common high-risk HPV types.    How often should I have cervical cancer screening and which tests should I have?  How often you should have cervical cancer screening and which tests you should have depend on your age and health history:   Women aged 21-29 years should have a Pap test alone every 3 years. HPV testing is not recommended.  Women aged 30-65 years should have a Pap test and an HPV test (co-testing) every 5 years (preferred). It also is acceptable to have a Pap test alone every 3 years.    When should I stop having cervical cancer screening? You should stop having cervical cancer screening after age 65 years if:  you do not have a history of moderate or severe abnormal cervical cells or cervical cancer, and  you have had either three negative Pap test results in a row or two negative co-test results in a row within the past 10 years, with the  most recent test performed within the past 5 years.    If I have had a hysterectomy, do I still need cervical cancer screening?  If you have had a hysterectomy, you still may need screening. The decision is based on whether your cervix was removed, why the hysterectomy was needed, and whether you have a history of moderate or severe cervical cell changes or cervical cancer. Even if your cervix is removed at the time of hysterectomy, cervical cells can still be present at the top of the vagina. If you have a history of cervical cancer or cervical cell changes, you should continue to have screening for 20 years after the time of your surgery.    Are there any women who should not follow routine cervical cancer screening guidelines?  Yes. Women who have a history of cervical cancer, are infected with human immunodeficiency virus (HIV), have a weakened immune system, or who were exposed to diethylstilbestrol (EDUARDA) before birth may require more frequent screening and should not follow these routine guidelines.    Having an HPV vaccination (such as Gardasil) does not change screening recommendations. Women who have been vaccinated against HPV still need to follow the screening recommendations for their age group.    What does it mean if I have an abnormal cervical cancer screening test result?  Many women have abnormal cervical cancer screening results. An abnormal result does not mean that you have cancer. Remember that cervical cell changes often go back to normal on their own. And if they do not, it often takes several years for even high-grade changes to become cancer.     If you have an abnormal screening test result, additional testing is needed to find out whether high-grade changes or cancer actually are present. Sometimes, only repeat testing is needed. In other cases, colposcopy and cervical biopsy may be recommended to find out how severe the changes really are. If results of follow-up tests indicate high-grade  changes, you may need treatment to remove the abnormal cells. You will need follow-up testing after treatment and will need to get regular cervical cancer screening after the follow-up is complete.    How accurate are cervical cancer screening test results?  As with any lab test, cervical cancer screening results are not always accurate. Sometimes, the results show abnormal cells when the cells are normal. This is called a “false-positive” result. Cervical cancer screening also may not detect abnormal cells when they are present. This is called a “false-negative” result. To help prevent false-negative or false-positive results, you should avoid douching, sexual intercourse, and using vaginal medications or hygiene products for 2 days before your test. You also should avoid cervical cancer screening when you have your menstrual period.    Designed as an aid to patients, this document sets forth current information and opinions related to women’s health. The information does not dictate an exclusive course of treatment or procedure to be followed and should not be construed as excluding other acceptable methods of practice. Variations, taking into account the needs of the individual patient, resources, and limitations unique to the institution or type of practice, may be appropriate.    Copyright September 2017 by the [employer]

## 2024-02-01 NOTE — PROGRESS NOTES
02/01/2024  Chief Complaint   Patient presents with    Annual Exam       Patient Care Team:  Juju Mcneil MD as PCP - General (Family Medicine)  Leonor Kuhn MD as Consulting Physician (General Surgery)  Donovan Reyna MD as Consulting Physician (General Surgery)  Barby Boudreaux DO as Gynecologist (Obstetrics and Gynecology)]       Latanya Morgan is here for her annual preventive exam. History per MA reviewed.     Struggling with alternating constipation and diarrhea. Abdominal pain lower bilateral that comes and goes. Struggles with diet, will eat and eat when not hungry, eats beyond full. Already thinking about lunch from [employer] and it is only 9 AM. Has tried many meds for anxiety but nothing has really helped.     Pap up to date per care everywhere. Pt reports pap was normal.    Health Maintenance   Topic Date Due    ANNUAL PHYSICAL  Never done    COVID-19 Vaccine (3 - 2023-24 season) 04/21/2024 (Originally 9/1/2023)    PAP SMEAR  07/12/2026    TDAP/TD VACCINES (3 - Td or Tdap) 12/09/2030    HEPATITIS C SCREENING  Completed    INFLUENZA VACCINE  Completed    HPV VACCINES  Completed    Pneumococcal Vaccine 0-64  Aged Out    CHLAMYDIA SCREENING  Discontinued       Immunization History   Administered Date(s) Administered    COVID-19 (MODERNA) 1st,2nd,3rd Dose Monovalent 08/09/2021, 09/08/2021    DTaP 1995, 1995, 1995, 12/17/1998, 07/11/2002    FluMist 2-49yrs 11/26/2014    Flublok 18+yrs 10/26/2023    Fluzone (or Fluarix & Flulaval for VFC) >6mos 10/31/2020, 10/13/2021, 01/27/2023    HPV, Unspecified 08/04/2008, 10/06/2008, 02/12/2009    Hep B, Adolescent or Pediatric 1997, 10/14/2020    Hep B, Unspecified 1997, 1997    Hepatitis A 11/26/2018    Hepatitis B 04/16/2019    HiB 1995, 1997, 12/17/1998    Hib (PRP-T) 1997    IPV 07/11/2002    MMR 08/24/1998, 07/11/2008    Meningococcal MCV4P (Menactra) 08/04/2008, 06/09/2015    OPV  "1997    Polio, Unspecified 1997, 03/13/1998    Tdap 08/04/2008, 12/09/2020    Varicella 05/13/1998, 08/11/2008, 12/09/2020    flucelvax quad pfs =>4 YRS 11/26/2018         The following portions of the patient's history were reviewed and updated as appropriate: allergies, current medications, past family history, past medical history, past social history, past surgical history and problem list.    Objective   Visit Vitals  /68 (BP Location: Left arm, Patient Position: Sitting, Cuff Size: Adult)   Pulse 98   Temp 97.3 °F (36.3 °C) (Temporal)   Resp 16   Ht 170.2 cm (67\")   Wt 57.1 kg (125 lb 12.8 oz)   SpO2 100%   BMI 19.70 kg/m²        Physical Exam  Vitals and nursing note reviewed.   Constitutional:       General: She is not in acute distress.     Appearance: Normal appearance. She is well-developed and well-groomed. She is not ill-appearing, toxic-appearing or diaphoretic.   HENT:      Head: Normocephalic and atraumatic. Hair is normal.      Right Ear: Hearing, tympanic membrane, ear canal and external ear normal.      Left Ear: Hearing, tympanic membrane, ear canal and external ear normal.      Nose: Nose normal.      Mouth/Throat:      Mouth: Mucous membranes are moist.   Eyes:      General: Lids are normal. Gaze aligned appropriately. No scleral icterus.        Right eye: No discharge.         Left eye: No discharge.      Extraocular Movements: Extraocular movements intact.      Conjunctiva/sclera: Conjunctivae normal.      Pupils: Pupils are equal, round, and reactive to light.   Neck:      Thyroid: No thyromegaly.      Trachea: Trachea and phonation normal. No tracheal deviation.   Cardiovascular:      Rate and Rhythm: Normal rate and regular rhythm.      Heart sounds: Normal heart sounds. No murmur heard.     No friction rub. No gallop.   Pulmonary:      Effort: Pulmonary effort is normal.      Breath sounds: Normal breath sounds and air entry.   Abdominal:      General: Bowel sounds are " "normal. There is no distension.      Palpations: Abdomen is soft. Abdomen is not rigid. There is no mass.      Tenderness: There is abdominal tenderness in the right lower quadrant. There is no guarding or rebound.   Musculoskeletal:         General: No tenderness or deformity.      Cervical back: Neck supple.      Right lower leg: No edema.      Left lower leg: No edema.   Skin:     General: Skin is warm.      Capillary Refill: Capillary refill takes less than 2 seconds.      Coloration: Skin is not cyanotic, jaundiced or pale.      Findings: No erythema or rash.      Nails: There is no clubbing.   Neurological:      General: No focal deficit present.      Mental Status: She is alert and oriented to person, place, and time.      Cranial Nerves: No cranial nerve deficit.      Motor: No tremor, atrophy, abnormal muscle tone or seizure activity.      Gait: Gait is intact. Gait normal.   Psychiatric:         Attention and Perception: Attention and perception normal.         Mood and Affect: Mood and affect normal.         Speech: Speech normal.         Behavior: Behavior normal. Behavior is cooperative.         Thought Content: Thought content normal.         Cognition and Memory: Cognition and memory normal.         Judgment: Judgment normal.         No results found for: \"CHLPL\", \"TRIG\", \"HDL\", \"LDL\", \"LDLDIRECT\"  Lab Results   Component Value Date    TSH 3.800 09/28/2023     Lab Results   Component Value Date    FREET4 1.01 09/28/2023     No results found for: \"HGBA1C\"    Assessment   Diagnoses and all orders for this visit:    1. Annual physical exam (Primary)    2. Chronic pain of both shoulders  Comments:  work on posture. proper fitting bra. order for massage given, she will schedule if insurance covers  Orders:  -     Ambulatory Referral to Massage Therapy    3. Binge eating disorder  -     Discontinue: lisdexamfetamine (Vyvanse) 30 MG capsule; Take 1 capsule by mouth Every Morning  Dispense: 30 capsule; " Refill: 0    4. Attention deficit    5. Irritable bowel syndrome with both constipation and diarrhea  Comments:  abdominal massage, fiber, water, consider limiting lactose and gluten, fodmap diet choices, etc         Health maintenance information provided via patient plan (after visit summary).   Counseled on age appropriate health screenings and immunizations.  Encouraged exercise and healthy diet.    BMI is within normal parameters. No other follow-up for BMI required.      We discussed binge eating disorder and patient meets the major DSM-5 Criteria:  -Regularly eating more food than most people would under similar circumstances  -Feeling one's eating is out of control during a binge  -Being upset by binge eating  -Binge episodes occur at least weekly for 3 months    To qualify for BED three of the following must also be true  -Eating extremely fast  -Eating beyond feeling full  -Eating large amounts when not hungry  -Feeling bad about oneself after binge     Trial of Vyvanse 30 mg. UDS and drug contract next visit if wishes to stay on therapy. Stop if any side effects. Can titrate dose up if needed. Historically has been prescribed medicines for anxiety but none helped. May actually have underlying ADHD.  Struggles with concentration. Has hard time waiting her turn to speak, etc. Family history of similar issues with success via Vyvanse.    Return in about 4 weeks (around 2/29/2024) for controlled med follow up.     Juju Mcneil MD

## 2024-02-01 NOTE — TELEPHONE ENCOUNTER
From: Latanya Morgan  To: Juju Mcneil  Sent: 2/1/2024 9:50 AM EST  Subject: Prescription on back order    Hello, [facility] just called and said it is on back order for name brand. [facility] has it in stock if you could just call it in there.   Thank you!

## 2024-02-05 RX ORDER — LISDEXAMFETAMINE DIMESYLATE CAPSULES 30 MG/1
30 CAPSULE ORAL EVERY MORNING
Qty: 30 CAPSULE | Refills: 0 | Status: SHIPPED | OUTPATIENT
Start: 2024-02-05

## 2024-02-09 ENCOUNTER — PRIOR AUTHORIZATION (OUTPATIENT)
Dept: INTERNAL MEDICINE | Facility: CLINIC | Age: 29
End: 2024-02-09
Payer: COMMERCIAL

## 2024-02-13 ENCOUNTER — PATIENT MESSAGE (OUTPATIENT)
Dept: INTERNAL MEDICINE | Facility: CLINIC | Age: 29
End: 2024-02-13
Payer: COMMERCIAL

## 2024-02-13 DIAGNOSIS — F50.81 BINGE EATING DISORDER: ICD-10-CM

## 2024-02-13 RX ORDER — LISDEXAMFETAMINE DIMESYLATE CAPSULES 30 MG/1
30 CAPSULE ORAL EVERY MORNING
Qty: 30 CAPSULE | Refills: 0 | Status: SHIPPED | OUTPATIENT
Start: 2024-02-13

## 2024-03-12 ENCOUNTER — OFFICE VISIT (OUTPATIENT)
Dept: INTERNAL MEDICINE | Facility: CLINIC | Age: 29
End: 2024-03-12
Payer: COMMERCIAL

## 2024-03-12 VITALS
SYSTOLIC BLOOD PRESSURE: 108 MMHG | DIASTOLIC BLOOD PRESSURE: 64 MMHG | HEART RATE: 95 BPM | OXYGEN SATURATION: 100 % | BODY MASS INDEX: 18.52 KG/M2 | HEIGHT: 67 IN | WEIGHT: 118 LBS | TEMPERATURE: 98 F

## 2024-03-12 DIAGNOSIS — R09.82 PND (POST-NASAL DRIP): ICD-10-CM

## 2024-03-12 DIAGNOSIS — Z51.81 MEDICATION MONITORING ENCOUNTER: Primary | ICD-10-CM

## 2024-03-12 DIAGNOSIS — F50.81 BINGE EATING DISORDER: ICD-10-CM

## 2024-03-12 RX ORDER — LISDEXAMFETAMINE DIMESYLATE CAPSULES 30 MG/1
30 CAPSULE ORAL EVERY MORNING
Qty: 30 CAPSULE | Refills: 0 | Status: SHIPPED | OUTPATIENT
Start: 2024-03-12

## 2024-03-12 NOTE — PROGRESS NOTES
Office Visit      Date: 2024   Patient Name: Latanya Morgan  : 1995   MRN: [id number]     Chief Complaint:    Chief Complaint   Patient presents with    Follow-up     From appointment with Dr. Mcneil on : Trial of Vyvanse 30 mg. UDS and drug contract next visit if wishes to stay on therapy.        History of Present Illness: Latanya Morgan is a 28 y.o. female presents for follow-up for Vyvanse refill.  Taking 30 mg daily.  Medication is over $100 at the pharmacy and she is interested in possibly trying Adderall in the future but right now she is okay to continue.  She will discuss this next time with PCP, Dr. Ferrari.  The medication is working well.  Her anxiety is less and she is not binge eating as much she was.  Focusing better on tasks.  Feels drainage down her throat, son has been sick.  No fever, myalgia, chills, cough.    Subjective      Review of Systems:   Pertinent ROS noted in HPI.     I have reviewed the patients family history, social history, past medical history, past surgical history and have updated it as appropriate.     Medications:     Current Outpatient Medications:     lisdexamfetamine (Vyvanse) 30 MG capsule, Take 1 capsule by mouth Every Morning, Disp: 30 capsule, Rfl: 0    Mirena, 52 MG, 20 MCG/DAY intrauterine device IUD, , Disp: , Rfl:     Allergies:   No Known Allergies    Objective     Physical Exam  Physical Exam  Vitals and nursing note reviewed.   Constitutional:       General: She is not in acute distress.     Appearance: Normal appearance.   HENT:      Head: Normocephalic and atraumatic.      Right Ear: Tympanic membrane, ear canal and external ear normal.      Left Ear: Tympanic membrane, ear canal and external ear normal.      Nose: Nose normal.      Mouth/Throat:      Mouth: Mucous membranes are moist.      Pharynx: No oropharyngeal exudate or posterior oropharyngeal erythema.      Comments: Clear PND  Eyes:      General: No scleral icterus.   "      Right eye: No discharge.         Left eye: No discharge.      Extraocular Movements: Extraocular movements intact.      Conjunctiva/sclera: Conjunctivae normal.      Pupils: Pupils are equal, round, and reactive to light.   Cardiovascular:      Rate and Rhythm: Normal rate.   Pulmonary:      Effort: Pulmonary effort is normal. No respiratory distress.   Musculoskeletal:         General: Normal range of motion.      Cervical back: Normal range of motion and neck supple.   Lymphadenopathy:      Cervical: Cervical adenopathy (left- pt states been there for years stable) present.   Skin:     General: Skin is warm and dry.   Neurological:      General: No focal deficit present.      Mental Status: She is alert and oriented to person, place, and time.   Psychiatric:         Mood and Affect: Mood normal.         Behavior: Behavior normal.         Vital Signs:   Vitals:    03/12/24 0811   BP: 108/64   Pulse: 95   Temp: 98 °F (36.7 °C)   SpO2: 100%   Weight: 53.5 kg (118 lb)   Height: 170.2 cm (67\")     Body mass index is 18.48 kg/m².      Assessment / Plan      Assessment/Plan:   Problem List Items Addressed This Visit    None  Visit Diagnoses       Medication monitoring encounter    -  Primary    Relevant Orders    ToxAssure Flex 23, Ur - Urine, Clean Catch    Binge eating disorder        PND (post-nasal drip)                Medication working well, only concern is cost  She wants to continue therapy and discuss this next visit Dr. Mcneil   Pended refill to Dr. Jamil since Dr. Mcneil is out for refill  UDS collected  Hi appropriate     Exam normal other than PND  Antihistamines, flonase, cool mist humidifier, warm salt water gargles   Notify if symptoms persist/worsen     Follow Up:   Return in about 1 month (around 4/12/2024) for Follow up Janes OCASIO  [facility]  "

## 2024-03-15 LAB
1OH-MIDAZOLAM UR QL SCN: NOT DETECTED NG/MG CREAT
6MAM UR QL SCN: NEGATIVE NG/ML
7AMINOCLONAZEPAM/CREAT UR: NOT DETECTED NG/MG CREAT
A-OH ALPRAZ/CREAT UR: NOT DETECTED NG/MG CREAT
A-OH-TRIAZOLAM/CREAT UR CFM: NOT DETECTED NG/MG CREAT
ACP UR QL CFM: NOT DETECTED
ALPRAZ/CREAT UR CFM: NOT DETECTED NG/MG CREAT
AMPHET UR CFM-MCNC: 2517 NG/MG CREAT
AMPHETAMINES UR QL SCN: NORMAL NG/ML
AMPHETAMINES UR QL: NORMAL
APAP UR QL SCN: NEGATIVE UG/ML
BARBITURATES UR QL SCN: NEGATIVE NG/ML
BENZODIAZ SCN METH UR: NEGATIVE
BUPRENORPHINE UR QL SCN: NEGATIVE
BUPRENORPHINE/CREAT UR: NOT DETECTED NG/MG CREAT
CANNABINOIDS UR QL SCN: NEGATIVE NG/ML
CARISOPRODOL UR QL: NEGATIVE NG/ML
CLONAZEPAM/CREAT UR CFM: NOT DETECTED NG/MG CREAT
COCAINE+BZE UR QL SCN: NEGATIVE NG/ML
CREAT UR-MCNC: 241 MG/DL
D-METHORPHAN UR-MCNC: NOT DETECTED NG/ML
D-METHORPHAN+LEVORPHANOL UR QL: NOT DETECTED
DESALKYLFLURAZ/CREAT UR: NOT DETECTED NG/MG CREAT
DIAZEPAM/CREAT UR: NOT DETECTED NG/MG CREAT
ETHANOL UR QL SCN: NEGATIVE G/DL
ETHANOL UR QL SCN: NEGATIVE NG/ML
FENTANYL CTO UR SCN-MCNC: NEGATIVE NG/ML
FENTANYL/CREAT UR: NOT DETECTED NG/MG CREAT
FLUNITRAZEPAM UR QL SCN: NOT DETECTED NG/MG CREAT
GABAPENTIN UR-MCNC: NEGATIVE UG/ML
HYPNOTICS UR QL SCN: NEGATIVE
KETAMINE UR QL: NOT DETECTED
LORAZEPAM/CREAT UR: NOT DETECTED NG/MG CREAT
MDA UR CFM-MCNC: NOT DETECTED NG/MG CREAT
MDMA UR CFM-MCNC: NOT DETECTED NG/MG CREAT
MEPERIDINE UR QL SCN: NEGATIVE NG/ML
METHADONE UR QL SCN: NEGATIVE NG/ML
METHADONE+METAB UR QL SCN: NEGATIVE NG/ML
METHAMPHET UR CFM-MCNC: NOT DETECTED NG/MG CREAT
MIDAZOLAM/CREAT UR CFM: NOT DETECTED NG/MG CREAT
MISCELLANEOUS, UR: NEGATIVE
NORBUPRENORPHINE/CREAT UR: NOT DETECTED NG/MG CREAT
NORDIAZEPAM/CREAT UR: NOT DETECTED NG/MG CREAT
NORFENTANYL/CREAT UR: NOT DETECTED NG/MG CREAT
NORFLUNITRAZEPAM UR-MCNC: NOT DETECTED NG/MG CREAT
NORKETAMINE UR-MCNC: NOT DETECTED UG/ML
OPIATES UR SCN-MCNC: NEGATIVE NG/ML
OTHER HALLUCINOGENS UR: NEGATIVE
OXAZEPAM/CREAT UR: NOT DETECTED NG/MG CREAT
OXYCODONE CTO UR SCN-MCNC: NEGATIVE NG/ML
PCP UR QL SCN: NEGATIVE NG/ML
PRESCRIBED MEDICATIONS: NORMAL
PROPOXYPH UR QL SCN: NEGATIVE NG/ML
TAPENTADOL CTO UR SCN-MCNC: NEGATIVE NG/ML
TEMAZEPAM/CREAT UR: NOT DETECTED NG/MG CREAT
TRAMADOL UR QL SCN: NEGATIVE NG/ML
ZALEPLON UR-MCNC: NOT DETECTED NG/ML
ZOLPIDEM PHENYL-4-CARB UR QL SCN: NOT DETECTED
ZOLPIDEM UR QL SCN: NOT DETECTED
ZOPICLONE-N-OXIDE UR-MCNC: NOT DETECTED NG/ML

## 2024-04-11 ENCOUNTER — PATIENT MESSAGE (OUTPATIENT)
Dept: INTERNAL MEDICINE | Facility: CLINIC | Age: 29
End: 2024-04-11
Payer: COMMERCIAL

## 2024-04-11 DIAGNOSIS — F50.81 BINGE EATING DISORDER: ICD-10-CM

## 2024-04-12 RX ORDER — LISDEXAMFETAMINE DIMESYLATE CAPSULES 30 MG/1
30 CAPSULE ORAL EVERY MORNING
Qty: 30 CAPSULE | Refills: 0 | Status: SHIPPED | OUTPATIENT
Start: 2024-04-12

## 2024-04-15 RX ORDER — LISDEXAMFETAMINE DIMESYLATE CAPSULES 30 MG/1
30 CAPSULE ORAL EVERY MORNING
Qty: 30 CAPSULE | Refills: 0 | OUTPATIENT
Start: 2024-04-15

## 2024-04-15 NOTE — TELEPHONE ENCOUNTER
[facility] insurance no longer covers [facility] so she is requesting a resend to [facility] pharmacy, please advise if appropriate

## 2024-04-16 ENCOUNTER — OFFICE VISIT (OUTPATIENT)
Dept: INTERNAL MEDICINE | Facility: CLINIC | Age: 29
End: 2024-04-16
Payer: COMMERCIAL

## 2024-04-16 VITALS
DIASTOLIC BLOOD PRESSURE: 70 MMHG | HEART RATE: 82 BPM | BODY MASS INDEX: 21.05 KG/M2 | RESPIRATION RATE: 16 BRPM | HEIGHT: 62 IN | OXYGEN SATURATION: 99 % | TEMPERATURE: 97.8 F | WEIGHT: 114.4 LBS | SYSTOLIC BLOOD PRESSURE: 110 MMHG

## 2024-04-16 DIAGNOSIS — F90.9 ATTENTION DEFICIT HYPERACTIVITY DISORDER (ADHD), UNSPECIFIED ADHD TYPE: ICD-10-CM

## 2024-04-16 DIAGNOSIS — K58.2 IRRITABLE BOWEL SYNDROME WITH BOTH CONSTIPATION AND DIARRHEA: ICD-10-CM

## 2024-04-16 DIAGNOSIS — J02.9 SORE THROAT: ICD-10-CM

## 2024-04-16 DIAGNOSIS — F50.81 BINGE EATING DISORDER: Primary | ICD-10-CM

## 2024-04-16 LAB
EXPIRATION DATE: NORMAL
INTERNAL CONTROL: NORMAL
Lab: NORMAL
S PYO AG THROAT QL: NEGATIVE

## 2024-04-16 PROCEDURE — 87880 STREP A ASSAY W/OPTIC: CPT | Performed by: FAMILY MEDICINE

## 2024-04-16 PROCEDURE — 99214 OFFICE O/P EST MOD 30 MIN: CPT | Performed by: FAMILY MEDICINE

## 2024-04-16 RX ORDER — DEXTROAMPHETAMINE SACCHARATE, AMPHETAMINE ASPARTATE, DEXTROAMPHETAMINE SULFATE AND AMPHETAMINE SULFATE 2.5; 2.5; 2.5; 2.5 MG/1; MG/1; MG/1; MG/1
10 TABLET ORAL 2 TIMES DAILY
Qty: 60 TABLET | Refills: 0 | Status: SHIPPED | OUTPATIENT
Start: 2024-04-16

## 2024-04-16 NOTE — PROGRESS NOTES
"Chief Complaint  ADHD (Medication follow up, wanting to possibly increase ), Sore Throat (Started about Friday or Saturday, has had exposure, pressure has also spread to bilateral ears and face), and Nasal Congestion (Started Saturday )    Subjective        Latanya Morgan presents to [facility]  History of Present Illness    History per MA reviewed.    Son has been sick as well, viral illness.    Vyvanse generic very expensive, per pt would not save money to try for brand name and use savings card. Med not as effective as it was, interested in dose increase or possibly trying Adderall as it may be cheaper option.    Change in bowel habits, more constipation. Was used to diarrhea especially after having gallbladder removed. Does not feel she's doing anything different than normal for diet. Vyvanse does not seem to correlate with bowel change.       Objective   Vital Signs:  /70 (BP Location: Right arm, Patient Position: Sitting, Cuff Size: Adult)   Pulse 82   Temp 97.8 °F (36.6 °C) (Temporal)   Resp 16   Ht 157.5 cm (62\")   Wt 51.9 kg (114 lb 6.4 oz)   SpO2 99%   BMI 20.92 kg/m²   Estimated body mass index is 20.92 kg/m² as calculated from the following:    Height as of this encounter: 157.5 cm (62\").    Weight as of this encounter: 51.9 kg (114 lb 6.4 oz).       BMI is within normal parameters. No other follow-up for BMI required.      Physical Exam  Vitals and nursing note reviewed.   Constitutional:       General: She is not in acute distress.     Appearance: Normal appearance. She is well-developed and well-groomed. She is not ill-appearing, toxic-appearing or diaphoretic.   HENT:      Head: Normocephalic and atraumatic.      Right Ear: Hearing normal.      Left Ear: Hearing normal.      Mouth/Throat:      Mouth: Mucous membranes are moist.      Pharynx: Uvula midline. Posterior oropharyngeal erythema present. No pharyngeal swelling or oropharyngeal exudate.    "   Comments: Aphthous ulcerations lower lip inner aspect  Eyes:      General: Lids are normal. No scleral icterus.     Extraocular Movements: Extraocular movements intact.   Neck:      Trachea: Phonation normal.   Cardiovascular:      Rate and Rhythm: Normal rate and regular rhythm.   Pulmonary:      Effort: Pulmonary effort is normal.      Breath sounds: Normal breath sounds.   Musculoskeletal:      Cervical back: Neck supple.   Lymphadenopathy:      Cervical:      Left cervical: Posterior cervical adenopathy present.   Skin:     Coloration: Skin is not jaundiced or pale.   Neurological:      General: No focal deficit present.      Mental Status: She is alert and oriented to person, place, and time.      Motor: Motor function is intact.   Psychiatric:         Attention and Perception: Attention and perception normal.         Mood and Affect: Mood and affect normal.         Speech: Speech normal.         Behavior: Behavior normal. Behavior is cooperative.         Thought Content: Thought content normal.         Cognition and Memory: Cognition and memory normal.         Judgment: Judgment normal.        Result Review :    The following data was reviewed by: Juju Mcneil MD on 04/16/2024:  ToxAssure Flex 23, Ur - Urine, Clean Catch (03/12/2024 08:39)     Office Visit on 04/16/2024   Component Date Value Ref Range Status    Rapid Strep A Screen 04/16/2024 Negative  Negative, VALID, INVALID, Not Performed Final    Internal Control 04/16/2024 Passed  Passed Final    Lot Number 04/16/2024 [id number]   Final    Expiration Date 04/16/2024 11/02/2026   Final                 Assessment and Plan     Diagnoses and all orders for this visit:    1. Binge eating disorder (Primary)  -     amphetamine-dextroamphetamine (Adderall) 10 MG tablet; Take 1 tablet by mouth 2 (Two) Times a Day.  Dispense: 60 tablet; Refill: 0    2. Attention deficit hyperactivity disorder (ADHD), unspecified ADHD type  -      amphetamine-dextroamphetamine (Adderall) 10 MG tablet; Take 1 tablet by mouth 2 (Two) Times a Day.  Dispense: 60 tablet; Refill: 0    3. Sore throat  Comments:  cannot rule out hand/foot/mouth viral etiology  Orders:  -     POCT rapid strep A    4. Irritable bowel syndrome with both constipation and diarrhea      Change in bowel habits--try to drink more water, more fiber in diet. If issues continue consider colonoscopy with general surgery.    Trial of Adderall in place of Vyvanse.        Follow Up     Return in about 3 months (around 7/22/2024) for Controlled Rx Follow Up.  Patient was given instructions and counseling regarding her condition or for health maintenance advice. Please see specific information pulled into the AVS if appropriate.

## 2024-05-04 ENCOUNTER — PATIENT MESSAGE (OUTPATIENT)
Dept: INTERNAL MEDICINE | Facility: CLINIC | Age: 29
End: 2024-05-04
Payer: COMMERCIAL

## 2024-05-04 DIAGNOSIS — F50.81 BINGE EATING DISORDER: Primary | ICD-10-CM

## 2024-05-04 DIAGNOSIS — F90.9 ATTENTION DEFICIT HYPERACTIVITY DISORDER (ADHD), UNSPECIFIED ADHD TYPE: ICD-10-CM

## 2024-05-06 RX ORDER — LISDEXAMFETAMINE DIMESYLATE 30 MG/1
30 CAPSULE ORAL EVERY MORNING
Qty: 30 CAPSULE | Refills: 0 | Status: SHIPPED | OUTPATIENT
Start: 2024-05-06

## 2024-05-30 ENCOUNTER — PATIENT MESSAGE (OUTPATIENT)
Dept: INTERNAL MEDICINE | Facility: CLINIC | Age: 29
End: 2024-05-30
Payer: COMMERCIAL

## 2024-05-30 DIAGNOSIS — F50.81 BINGE EATING DISORDER: ICD-10-CM

## 2024-05-30 DIAGNOSIS — F90.9 ATTENTION DEFICIT HYPERACTIVITY DISORDER (ADHD), UNSPECIFIED ADHD TYPE: Primary | ICD-10-CM

## 2024-05-31 RX ORDER — LISDEXAMFETAMINE DIMESYLATE 40 MG/1
40 CAPSULE ORAL EVERY MORNING
Qty: 30 CAPSULE | Refills: 0 | Status: SHIPPED | OUTPATIENT
Start: 2024-05-31

## 2024-06-03 ENCOUNTER — PRIOR AUTHORIZATION (OUTPATIENT)
Dept: INTERNAL MEDICINE | Facility: CLINIC | Age: 29
End: 2024-06-03
Payer: COMMERCIAL

## 2024-06-03 ENCOUNTER — TELEPHONE (OUTPATIENT)
Dept: INTERNAL MEDICINE | Facility: CLINIC | Age: 29
End: 2024-06-03

## 2024-06-03 ENCOUNTER — TELEPHONE (OUTPATIENT)
Dept: INTERNAL MEDICINE | Facility: CLINIC | Age: 29
End: 2024-06-03
Payer: COMMERCIAL

## 2024-06-03 NOTE — TELEPHONE ENCOUNTER
Patient called back with card information, documented in a  telephone encounter, nothing further needed with this encounter at this time.

## 2024-06-03 NOTE — TELEPHONE ENCOUNTER
Called PT to clarify what is going on with the PA for her Vyvanse and get the numbers from her RX Card to resubmit.

## 2024-06-03 NOTE — TELEPHONE ENCOUNTER
Caller: Latanya Morgan    Relationship: Self    Best call back number: [phone]    Who are you requesting to speak with (clinical staff, provider,  specific staff member): FRANSISCA ARGUETA    What was the call regarding: PATIENT CALLED TO GIVE THE RX CARD NUMBER ID. RX CARD NUMBER [id number]    PATIENT STATED THE PHARMACY DID SENT THE CARD IN THE PRIOR AUTHORIZATION FROM THE PHARMACY.

## 2024-06-03 NOTE — TELEPHONE ENCOUNTER
Caller: Latanya Morgan    Relationship: Self    Best call back number: [phone]    What is the best time to reach you: ANY    Who are you requesting to speak with (clinical staff, provider,  specific staff member): Earline Allen MA     Do you know the name of the person who called:Earline Allen MA     What was the call regarding: RX NUMBERS FOR PA:    ID# [id number]          GR# [id number]    PCN#  [id number]       BIN# [id number]    Is it okay if the provider responds through MyChart: YES

## 2024-06-04 NOTE — TELEPHONE ENCOUNTER
PRIOR AUTH RESUBMITTED WITH [facility] RX NUMBERS, WAITING FOR REPLY. ADDED RX NUMBERS TO PTS DEMOGRAPHIC FOR FUTURE REFERENCE.

## 2024-06-11 ENCOUNTER — TELEPHONE (OUTPATIENT)
Dept: INTERNAL MEDICINE | Facility: CLINIC | Age: 29
End: 2024-06-11

## 2024-06-11 NOTE — TELEPHONE ENCOUNTER
Caller: Latanya Morgan    Relationship: Self    Best call back number: [phone]    What was the call regarding: PATIENT CALLED TO CHECK THE STATUS OF A PRIOR AUTHORIZATION FOR     lisdexamfetamine (Vyvanse) 40 MG capsule

## 2024-07-16 ENCOUNTER — OFFICE VISIT (OUTPATIENT)
Dept: INTERNAL MEDICINE | Facility: CLINIC | Age: 29
End: 2024-07-16
Payer: COMMERCIAL

## 2024-07-16 VITALS
HEIGHT: 62 IN | BODY MASS INDEX: 20.06 KG/M2 | TEMPERATURE: 97.5 F | HEART RATE: 82 BPM | WEIGHT: 109 LBS | RESPIRATION RATE: 16 BRPM | OXYGEN SATURATION: 100 % | DIASTOLIC BLOOD PRESSURE: 68 MMHG | SYSTOLIC BLOOD PRESSURE: 112 MMHG

## 2024-07-16 DIAGNOSIS — E16.2 HYPOGLYCEMIA: Primary | ICD-10-CM

## 2024-07-16 DIAGNOSIS — F50.81 BINGE EATING DISORDER: ICD-10-CM

## 2024-07-16 DIAGNOSIS — F90.9 ATTENTION DEFICIT HYPERACTIVITY DISORDER (ADHD), UNSPECIFIED ADHD TYPE: ICD-10-CM

## 2024-07-16 PROCEDURE — 99214 OFFICE O/P EST MOD 30 MIN: CPT | Performed by: FAMILY MEDICINE

## 2024-07-16 RX ORDER — BLOOD-GLUCOSE METER
1 EACH MISCELLANEOUS AS NEEDED
Qty: 1 EACH | Refills: 0 | Status: SHIPPED | OUTPATIENT
Start: 2024-07-16

## 2024-07-16 RX ORDER — LANCETS
EACH MISCELLANEOUS
Qty: 100 EACH | Refills: 12 | Status: SHIPPED | OUTPATIENT
Start: 2024-07-16

## 2024-07-16 RX ORDER — BLOOD SUGAR DIAGNOSTIC
STRIP MISCELLANEOUS
Qty: 100 EACH | Refills: 12 | Status: SHIPPED | OUTPATIENT
Start: 2024-07-16

## 2024-07-16 RX ORDER — LISDEXAMFETAMINE DIMESYLATE 40 MG/1
40 CAPSULE ORAL EVERY MORNING
Qty: 30 CAPSULE | Refills: 0 | Status: SHIPPED | OUTPATIENT
Start: 2024-07-16

## 2024-07-16 NOTE — PROGRESS NOTES
"Chief Complaint  ADHD (3 month follow up)    Subjective        Latanya Morgan presents to [facility]  History of Present Illness  On Adderall felt relaxed but less focused and more tired.  Vyvanse is much better.   Wishes to continue current dose.    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     Low sugars it feels like at times  Was approx 107 lb prior to pregnancy  Has been around 115 lb after and then recently had stomach bug and lost  Now weight hanging at this level.  Eating all of the time but maybe not best food      Objective   Vital Signs:  /68 (BP Location: Left arm, Patient Position: Sitting, Cuff Size: Adult)   Pulse 82   Temp 97.5 °F (36.4 °C) (Temporal)   Resp 16   Ht 157.5 cm (62\")   Wt 49.4 kg (109 lb)   SpO2 100%   BMI 19.94 kg/m²   Estimated body mass index is 19.94 kg/m² as calculated from the following:    Height as of this encounter: 157.5 cm (62\").    Weight as of this encounter: 49.4 kg (109 lb).       BMI is within normal parameters. No other follow-up for BMI required.      Physical Exam  Vitals and nursing note reviewed.   Constitutional:       General: She is not in acute distress.     Appearance: Normal appearance. She is well-developed, well-groomed and normal weight. She is not ill-appearing, toxic-appearing or diaphoretic.   HENT:      Head: Normocephalic and atraumatic.      Right Ear: Hearing normal.      Left Ear: Hearing normal.   Eyes:      General: Lids are normal. No scleral icterus.     Extraocular Movements: Extraocular movements intact.   Neck:      Trachea: Phonation normal.   Cardiovascular:      Rate and Rhythm: Normal rate and regular rhythm.   Pulmonary:      Effort: Pulmonary effort is normal.   Musculoskeletal:      Cervical back: Neck supple.   Skin:     Coloration: Skin is not jaundiced or pale.   Neurological:      General: No focal deficit present.      Mental Status: She is alert and oriented to person, place, and time.      " Motor: Motor function is intact.   Psychiatric:         Attention and Perception: Attention and perception normal.         Mood and Affect: Mood and affect normal.         Speech: Speech normal.         Behavior: Behavior normal. Behavior is cooperative.         Thought Content: Thought content normal.         Cognition and Memory: Cognition and memory normal.         Judgment: Judgment normal.        Result Review :    The following data was reviewed by: Juju Mcneil MD on 07/16/2024:  ToxAssure Flex 23, Ur - Urine, Clean Catch (03/12/2024 08:39)     Lab Results   Component Value Date    TSH 3.800 09/28/2023     Lab Results   Component Value Date    FREET4 1.01 09/28/2023               Assessment and Plan     Diagnoses and all orders for this visit:    1. Hypoglycemia (Primary)  -     Blood Glucose Monitoring Suppl (Contour Next One) device; Use as directed to test blood glucose once daily  Dispense: 1 each; Refill: 0  -     Microlet Lancets misc; Use as directed to test blood glucose once daily  Dispense: 100 each; Refill: 12  -     glucose blood (Contour Next Test) test strip; Use as directed to test blood glucose once daily  Dispense: 100 each; Refill: 12  -     Alcohol Swabs (Alcohol Pads) 70 % pads; Check sugars as needed/directed for hypoglycemia.  Dispense: 100 each; Refill: 12    2. Attention deficit hyperactivity disorder (ADHD), unspecified ADHD type  -     lisdexamfetamine (Vyvanse) 40 MG capsule; Take 1 capsule by mouth Every Morning  Dispense: 30 capsule; Refill: 0    3. Binge eating disorder  -     lisdexamfetamine (Vyvanse) 40 MG capsule; Take 1 capsule by mouth Every Morning  Dispense: 30 capsule; Refill: 0      Discussed diet. Encouraged less concentrated carbs and more protein/fiber. Hydration encouraged. Has lost weight but could also be related to new job, moving more. Monitor. Previously thyroid testing within normal limits, consider recheck if symptoms fail to resolve.       Follow Up      Return in about 3 months (around 10/21/2024) for Controlled Rx Follow Up.  Patient was given instructions and counseling regarding her condition or for health maintenance advice. Please see specific information pulled into the AVS if appropriate.

## 2024-08-12 ENCOUNTER — PATIENT MESSAGE (OUTPATIENT)
Dept: INTERNAL MEDICINE | Facility: CLINIC | Age: 29
End: 2024-08-12
Payer: COMMERCIAL

## 2024-08-12 DIAGNOSIS — F90.9 ATTENTION DEFICIT HYPERACTIVITY DISORDER (ADHD), UNSPECIFIED ADHD TYPE: Primary | ICD-10-CM

## 2024-08-12 DIAGNOSIS — F50.81 BINGE EATING DISORDER: ICD-10-CM

## 2024-08-12 RX ORDER — LISDEXAMFETAMINE DIMESYLATE 50 MG/1
50 CAPSULE ORAL EVERY MORNING
Qty: 30 CAPSULE | Refills: 0 | Status: SHIPPED | OUTPATIENT
Start: 2024-08-12

## 2024-08-12 NOTE — TELEPHONE ENCOUNTER
From: Latanya Morgan  To: Juju Mcneil  Sent: 8/12/2024 7:28 AM EDT  Subject: Vyvanse    I have 6 days left of my medicine and wanted to check to see if I could get a dose increase when you call it in?     Thank you!

## 2024-08-29 ENCOUNTER — PATIENT MESSAGE (OUTPATIENT)
Dept: INTERNAL MEDICINE | Facility: CLINIC | Age: 29
End: 2024-08-29
Payer: COMMERCIAL

## 2024-08-29 NOTE — TELEPHONE ENCOUNTER
From: Latanya Morgan  To: Juju Mcneil  Sent: 8/29/2024 11:18 AM EDT  Subject: Infection     I got a piercing a month ago in my cartilage. Yesterday it swelled up real big and was worse this morning. I removed the earring completely because it was so swollen. It has some white/pale yellow drainage that has come out of it now that I took the earring out. So I’m thinking it is infected. I’m wondering what I should do?

## 2024-08-30 ENCOUNTER — TELEMEDICINE (OUTPATIENT)
Dept: FAMILY MEDICINE CLINIC | Facility: TELEHEALTH | Age: 29
End: 2024-08-30
Payer: COMMERCIAL

## 2024-08-30 DIAGNOSIS — M94.8X9 PERICHONDRITIS: Primary | ICD-10-CM

## 2024-08-30 RX ORDER — MUPIROCIN 20 MG/G
1 OINTMENT TOPICAL 3 TIMES DAILY
Qty: 22 G | Refills: 0 | Status: SHIPPED | OUTPATIENT
Start: 2024-08-30 | End: 2024-09-06

## 2024-08-30 RX ORDER — CIPROFLOXACIN 500 MG/1
500 TABLET, FILM COATED ORAL 2 TIMES DAILY
Qty: 14 TABLET | Refills: 0 | Status: SHIPPED | OUTPATIENT
Start: 2024-08-30 | End: 2024-09-06

## 2024-08-30 NOTE — PROGRESS NOTES
"HPI  Latanya Morgan is a 29 y.o. female  presents with complaint of right middle ear cartilage piercing pain x2 days that is now going into right jaw. Removed earring yesterday. Had it pierced one month ago and has not had issues until now. Denies fever but has had chills (unknown if that is due to being at work or not). Has been taking ibuprofen with some relief in pain.    Review of Systems    Past Medical History:   Diagnosis Date    Abnormal Pap smear of cervix     Anxiety     Arthritis     Body piercing     ears and nose    Depression     Dysphagia     Patient reported \"sometimes feels like things won't go down\"    GERD (gastroesophageal reflux disease)     History of fracture     Coccyx and left arm    History of Holter monitoring 2018    states was WNL    Migraine     Seasonal allergies     Tattoos     Wears glasses        Family History   Problem Relation Age of Onset    Migraines Mother     Anxiety disorder Mother     Hypertension Maternal Grandmother     Anxiety disorder Maternal Grandmother     Arthritis Maternal Grandfather        Social History     Socioeconomic History    Marital status:     Highest education level: Some college, no degree   Tobacco Use    Smoking status: Former     Current packs/day: 0.00     Average packs/day: 0.3 packs/day for 4.0 years (1.0 ttl pk-yrs)     Types: Cigarettes, Electronic Cigarette     Start date: 2018     Quit date: 2021     Years since quitting: 3.6    Smokeless tobacco: Never   Vaping Use    Vaping status: Former   Substance and Sexual Activity    Alcohol use: Yes     Alcohol/week: 1.0 standard drink of alcohol     Types: 1 Glasses of wine per week     Comment: \"couple of beers once a week\" - reported no etoh abuse    Drug use: No    Sexual activity: Yes     Partners: Male     Birth control/protection: I.U.D.         There were no vitals taken for this visit.    PHYSICAL EXAM  Physical Exam   Constitutional: She appears well-developed and " well-nourished.   HENT:   Head: Normocephalic.   Ears:    Nose: Nose normal.   Neck: Neck normal appearance.  Pulmonary/Chest: Effort normal.   Neurological: She is alert.   Psychiatric: She has a normal mood and affect. Her speech is normal.       Diagnoses and all orders for this visit:    1. Perichondritis (Primary)  -     ciprofloxacin (Cipro) 500 MG tablet; Take 1 tablet by mouth 2 (Two) Times a Day for 7 days.  Dispense: 14 tablet; Refill: 0  -     mupirocin (BACTROBAN) 2 % ointment; Apply 1 Application topically to the appropriate area as directed 3 (Three) Times a Day for 7 days.  Dispense: 22 g; Refill: 0          FOLLOW-UP  As discussed during visit with [facility], if symptoms worsen or fail to improve, follow-up with PCP/Urgent Care/Emergency Department.    Patient verbalizes understanding of medications, instructions for treatment and follow-up.    Chandrika Cody, APRN  08/30/2024  17:50 EDT    The use of a video visit has been reviewed with the patient and verbal informed consent has been obtained. Myself and Latanya Morgan participated in this visit. The patient is located in [place], [place], and I am located in [place], [place]. [employer] Video Client were utilized.

## 2024-09-13 ENCOUNTER — PATIENT MESSAGE (OUTPATIENT)
Dept: INTERNAL MEDICINE | Facility: CLINIC | Age: 29
End: 2024-09-13
Payer: COMMERCIAL

## 2024-09-13 DIAGNOSIS — F90.9 ATTENTION DEFICIT HYPERACTIVITY DISORDER (ADHD), UNSPECIFIED ADHD TYPE: ICD-10-CM

## 2024-09-13 DIAGNOSIS — F50.81 BINGE EATING DISORDER: ICD-10-CM

## 2024-09-13 RX ORDER — LISDEXAMFETAMINE DIMESYLATE 50 MG/1
50 CAPSULE ORAL EVERY MORNING
Qty: 30 CAPSULE | Refills: 0 | Status: CANCELLED | OUTPATIENT
Start: 2024-09-13

## 2024-09-13 RX ORDER — LISDEXAMFETAMINE DIMESYLATE 50 MG/1
50 CAPSULE ORAL EVERY MORNING
Qty: 30 CAPSULE | Refills: 0 | Status: SHIPPED | OUTPATIENT
Start: 2024-09-13

## 2024-09-13 NOTE — TELEPHONE ENCOUNTER
UDS & CSA: 3/12/24    Rx Refill Note  Requested Prescriptions     Pending Prescriptions Disp Refills    lisdexamfetamine (Vyvanse) 50 MG capsule 30 capsule 0     Sig: Take 1 capsule by mouth Every Morning      Last office visit with prescribing clinician: 7/16/2024   Last telemedicine visit with prescribing clinician: Visit date not found   Next office visit with prescribing clinician: 10/23/2024                         Jen Kahn MA  09/13/24, 08:59 EDT

## 2024-09-13 NOTE — TELEPHONE ENCOUNTER
From: Latanya Morgan  To: Juju Mcneil  Sent: 9/13/2024 8:50 AM EDT  Subject: Refill    I just need a refill on my vyvanse. I wasn’t sure if I needed to message this way or to just request refill.   Thanks

## 2024-10-01 ENCOUNTER — TELEMEDICINE (OUTPATIENT)
Dept: FAMILY MEDICINE CLINIC | Facility: TELEHEALTH | Age: 29
End: 2024-10-01
Payer: COMMERCIAL

## 2024-10-01 DIAGNOSIS — B96.89 BACTERIAL SINUSITIS: Primary | ICD-10-CM

## 2024-10-01 DIAGNOSIS — J32.9 BACTERIAL SINUSITIS: Primary | ICD-10-CM

## 2024-10-01 PROCEDURE — 99213 OFFICE O/P EST LOW 20 MIN: CPT | Performed by: NURSE PRACTITIONER

## 2024-10-01 RX ORDER — FLUCONAZOLE 150 MG/1
TABLET ORAL
Qty: 2 TABLET | Refills: 0 | Status: SHIPPED | OUTPATIENT
Start: 2024-10-01

## 2024-10-01 RX ORDER — METHYLPREDNISOLONE 4 MG
TABLET, DOSE PACK ORAL
Qty: 21 EACH | Refills: 0 | Status: SHIPPED | OUTPATIENT
Start: 2024-10-01

## 2024-10-01 NOTE — PATIENT INSTRUCTIONS
Sinus Infection, Adult  A sinus infection is soreness and swelling (inflammation) of your sinuses. Sinuses are hollow spaces in the bones around your face. They are located:  Around your eyes.  In the middle of your forehead.  Behind your nose.  In your cheekbones.  Your sinuses and nasal passages are lined with a fluid called mucus. Mucus drains out of your sinuses. Swelling can trap mucus in your sinuses. This lets germs (bacteria, virus, or fungus) grow, which leads to infection. Most of the time, this condition is caused by a virus.  What are the causes?  Allergies.  Asthma.  Germs.  Things that block your nose or sinuses.  Growths in the nose (nasal polyps).  Chemicals or irritants in the air.  A fungus. This is rare.  What increases the risk?  Having a weak body defense system (immune system).  Doing a lot of swimming or diving.  Using nasal sprays too much.  Smoking.  What are the signs or symptoms?  The main symptoms of this condition are pain and a feeling of pressure around the sinuses. Other symptoms include:  Stuffy nose (congestion). This may make it hard to breathe through your nose.  Runny nose (drainage).  Soreness, swelling, and warmth in the sinuses.  A cough that may get worse at night.  Being unable to smell and taste.  Mucus that collects in the throat or the back of the nose (postnasal drip). This may cause a sore throat or bad breath.  Being very tired (fatigued).  A fever.  How is this diagnosed?  Your symptoms.  Your medical history.  A physical exam.  Tests to find out if your condition is short-term (acute) or long-term (chronic). Your doctor may:  Check your nose for growths (polyps).  Check your sinuses using a tool that has a light on one end (endoscope).  Check for allergies or germs.  Do imaging tests, such as an MRI or CT scan.  How is this treated?  Treatment for this condition depends on the cause and whether it is short-term or long-term.  If caused by a virus, your symptoms  should go away on their own within 10 days. You may be given medicines to relieve symptoms. They include:  Medicines that shrink swollen tissue in the nose.  A spray that treats swelling of the nostrils.  Rinses that help get rid of thick mucus in your nose (nasal saline washes).  Medicines that treat allergies (antihistamines).  Over-the-counter pain relievers.  If caused by bacteria, your doctor may wait to see if you will get better without treatment. You may be given antibiotic medicine if you have:  A very bad infection.  A weak body defense system.  If caused by growths in the nose, surgery may be needed.  Follow these instructions at home:  Medicines  Take, use, or apply over-the-counter and prescription medicines only as told by your doctor. These may include nasal sprays.  If you were prescribed an antibiotic medicine, take it as told by your doctor. Do not stop taking it even if you start to feel better.  Hydrate and humidify    Drink enough water to keep your pee (urine) pale yellow.  Use a cool mist humidifier to keep the humidity level in your home above 50%.  Breathe in steam for 10-15 minutes, 3-4 times a day, or as told by your doctor. You can do this in the bathroom while a hot shower is running.  Try not to spend time in cool or dry air.  Rest  Rest as much as you can.  Sleep with your head raised (elevated).  Make sure you get enough sleep each night.  General instructions    Put a warm, moist washcloth on your face 3-4 times a day, or as often as told by your doctor.  Use nasal saline washes as often as told by your doctor.  Wash your hands often with soap and water. If you cannot use soap and water, use hand .  Do not smoke. Avoid being around people who are smoking (secondhand smoke).  Keep all follow-up visits.  Contact a doctor if:  You have a fever.  Your symptoms get worse.  Your symptoms do not get better within 10 days.  Get help right away if:  You have a very bad headache.  You  cannot stop vomiting.  You have very bad pain or swelling around your face or eyes.  You have trouble seeing.  You feel confused.  Your neck is stiff.  You have trouble breathing.  These symptoms may be an emergency. Get help right away. Call 911.  Do not wait to see if the symptoms will go away.  Do not drive yourself to the hospital.  Summary  A sinus infection is swelling of your sinuses. Sinuses are hollow spaces in the bones around your face.  This condition is caused by tissues in your nose that become inflamed or swollen. This traps germs. These can lead to infection.  If you were prescribed an antibiotic medicine, take it as told by your doctor. Do not stop taking it even if you start to feel better.  Keep all follow-up visits.  This information is not intended to replace advice given to you by your health care provider. Make sure you discuss any questions you have with your health care provider.  Document Revised: 11/22/2022 Document Reviewed: 11/22/2022  [employer] Patient Education © 2024 [employer]

## 2024-10-01 NOTE — PROGRESS NOTES
"Chief Complaint   Patient presents with    Sinusitis       Video Visit Reason:   Free Text Description: Sinus pressure and congestion  Subjective   Latanya Morgan is a 29 y.o. female.     History of Present Illness  Sinus congestion, sinus pain and pressure with hoarseness, cough and chest pain with cough for several days. She is prone to sinus infections, especially in the fall.   Sinusitis  The problem has been gradually worsening since onset. There has been no fever. Associated symptoms include congestion, coughing, ear pain, headaches, a hoarse voice, sinus pressure and a sore throat. Pertinent negatives include no chills or shortness of breath.       The following portions of the patient's history were reviewed and updated as appropriate: allergies, current medications, past medical history, and problem list.      Past Medical History:   Diagnosis Date    Abnormal Pap smear of cervix     Anxiety     Arthritis     Body piercing     ears and nose    Depression     Dysphagia     Patient reported \"sometimes feels like things won't go down\"    GERD (gastroesophageal reflux disease)     History of fracture     Coccyx and left arm    History of Holter monitoring 2018    states was WNL    Migraine     Seasonal allergies     Tattoos     Wears glasses      Social History     Socioeconomic History    Marital status:     Highest education level: Some college, no degree   Tobacco Use    Smoking status: Former     Current packs/day: 0.00     Average packs/day: 0.3 packs/day for 4.0 years (1.0 ttl pk-yrs)     Types: Cigarettes, Electronic Cigarette     Start date: 2018     Quit date: 2021     Years since quitting: 3.7    Smokeless tobacco: Never   Vaping Use    Vaping status: Former   Substance and Sexual Activity    Alcohol use: Yes     Alcohol/week: 1.0 standard drink of alcohol     Types: 1 Glasses of wine per week     Comment: \"couple of beers once a week\" - reported no etoh abuse    Drug use: No    Sexual " activity: Yes     Partners: Male     Birth control/protection: I.U.D.     medication documentation: reviewed and updated with patient and   Current Outpatient Medications:     adapalene (DIFFERIN) 0.3 % gel, Apply a pea size amount to the face at bedtime., Disp: 45 g, Rfl: 1    Alcohol Swabs (Alcohol Pads) 70 % pads, Check sugars as needed/directed for hypoglycemia., Disp: 100 each, Rfl: 12    amoxicillin-clavulanate (AUGMENTIN) 875-125 MG per tablet, Take 1 tablet by mouth 2 (Two) Times a Day for 7 days., Disp: 14 tablet, Rfl: 0    Microlet Lancets misc, Use as directed to test blood glucose once daily, Disp: 100 each, Rfl: 12    fluconazole (Diflucan) 150 MG tablet, 150 mg x 1 dose and may repeat in 3 days if needed., Disp: 2 tablet, Rfl: 0    fluticasone (FLONASE) 50 MCG/ACT nasal spray, 2 sprays into the nostril(s) as directed by provider Daily., Disp: 9.9 mL, Rfl: 0    glucose blood (Contour Next Test) test strip, Use as directed to test blood glucose once daily, Disp: 100 each, Rfl: 12    Blood Glucose Monitoring Suppl (Contour Next One) device, Use as directed to test blood glucose once daily, Disp: 1 each, Rfl: 0    ketoconazole (NIZORAL) 2 % cream, Apply a thin layer to the face twice daily as needed for flaking., Disp: 60 g, Rfl: 11    ketoconazole (NIZORAL) 2 % shampoo, Wash scalp 2-3 times a week. Let sit 5 mins before rinsing. Follow with regular shampoo., Disp: 120 mL, Rfl: 11    lisdexamfetamine (Vyvanse) 50 MG capsule, Take 1 capsule by mouth Every Morning, Disp: 30 capsule, Rfl: 0    methylPREDNISolone (MEDROL) 4 MG dose pack, Take as directed on package instructions., Disp: 1 each, Rfl: 0    Mirena, 52 MG, 20 MCG/DAY intrauterine device IUD, , Disp: , Rfl:   Review of Systems   Constitutional:  Positive for activity change and fatigue. Negative for chills and fever.   HENT:  Positive for congestion, ear pain, hoarse voice, postnasal drip, sinus pressure, sinus pain, sore throat and voice change.     Respiratory:  Positive for cough and chest tightness. Negative for shortness of breath and wheezing.    Cardiovascular:  Positive for chest pain.   Gastrointestinal:  Negative for nausea.   Musculoskeletal:  Negative for myalgias.   Neurological:  Positive for headaches.   Hematological:  Positive for adenopathy.       Objective   Physical Exam  Constitutional:       Appearance: Normal appearance. She is well-developed.   HENT:      Nose: Congestion present.      Right Sinus: Maxillary sinus tenderness present.      Left Sinus: Maxillary sinus tenderness present.   Eyes:      Conjunctiva/sclera: Conjunctivae normal.   Pulmonary:      Effort: Pulmonary effort is normal.   Lymphadenopathy:      Head:      Right side of head: No tonsillar, preauricular or posterior auricular adenopathy.      Left side of head: No tonsillar, preauricular or posterior auricular adenopathy.      Cervical: No cervical adenopathy (patient guided exam).   Psychiatric:         Speech: Speech normal.         Assessment & Plan   Diagnoses and all orders for this visit:    1. Bacterial sinusitis (Primary)  -     amoxicillin-clavulanate (AUGMENTIN) 875-125 MG per tablet; Take 1 tablet by mouth 2 (Two) Times a Day for 7 days.  Dispense: 14 tablet; Refill: 0  -     methylPREDNISolone (MEDROL) 4 MG dose pack; Take as directed on package instructions.  Dispense: 1 each; Refill: 0    Other orders  -     fluconazole (Diflucan) 150 MG tablet; 150 mg x 1 dose and may repeat in 3 days if needed.  Dispense: 2 tablet; Refill: 0                    Follow Up:  If your symptoms are not resolving by the completion of your treatment or are worsening, see your primary care provider for follow up. If you don't have a primary care provider, you may go to any Urgent Care for re-evaluation. If you develop any life threatening symptoms, go to the nearest Emergency Department immediately or call EMS.               The use of  Video Visit was utilized during this  visit, using both [email or web address] and [employer]. The use of a video visit has been reviewed with the patient and verbal informed consent has been obtained. No technical difficulties occurred during the visit.    is located at [address] [place] [address] [place] [address]  Provider is located at [place], [place]

## 2024-10-15 ENCOUNTER — PATIENT MESSAGE (OUTPATIENT)
Dept: INTERNAL MEDICINE | Facility: CLINIC | Age: 29
End: 2024-10-15
Payer: COMMERCIAL

## 2024-10-15 DIAGNOSIS — F90.9 ATTENTION DEFICIT HYPERACTIVITY DISORDER (ADHD), UNSPECIFIED ADHD TYPE: ICD-10-CM

## 2024-10-15 DIAGNOSIS — F50.819 BINGE EATING DISORDER: ICD-10-CM

## 2024-10-15 NOTE — TELEPHONE ENCOUNTER
Rx Refill Note  Requested Prescriptions     Pending Prescriptions Disp Refills    lisdexamfetamine (Vyvanse) 50 MG capsule 30 capsule 0     Sig: Take 1 capsule by mouth Every Morning      Last office visit with prescribing clinician: 7/16/2024   Last telemedicine visit with prescribing clinician: Visit date not found   Next office visit with prescribing clinician: 10/23/2024     Riknu Estrada MA  10/15/24, 12:49 EDT

## 2024-10-16 RX ORDER — LISDEXAMFETAMINE DIMESYLATE 50 MG/1
50 CAPSULE ORAL EVERY MORNING
Qty: 7 CAPSULE | Refills: 0 | Status: SHIPPED | OUTPATIENT
Start: 2024-10-16

## 2024-10-17 ENCOUNTER — HOSPITAL ENCOUNTER (EMERGENCY)
Facility: HOSPITAL | Age: 29
Discharge: HOME | End: 2024-10-17
Attending: STUDENT IN AN ORGANIZED HEALTH CARE EDUCATION/TRAINING PROGRAM
Payer: COMMERCIAL

## 2024-10-17 VITALS
RESPIRATION RATE: 18 BRPM | SYSTOLIC BLOOD PRESSURE: 131 MMHG | HEART RATE: 113 BPM | DIASTOLIC BLOOD PRESSURE: 100 MMHG | BODY MASS INDEX: 19.32 KG/M2 | OXYGEN SATURATION: 100 % | HEIGHT: 62 IN | TEMPERATURE: 98 F | WEIGHT: 105 LBS

## 2024-10-17 DIAGNOSIS — L03.031 PARONYCHIA OF GREAT TOE OF RIGHT FOOT: Primary | ICD-10-CM

## 2024-10-17 PROCEDURE — 99283 EMERGENCY DEPT VISIT LOW MDM: CPT

## 2024-10-17 PROCEDURE — 99282 EMERGENCY DEPT VISIT SF MDM: CPT

## 2024-10-17 PROCEDURE — 25010000002 LIDOCAINE 1 % SOLUTION: Performed by: NURSE PRACTITIONER

## 2024-10-17 RX ORDER — LIDOCAINE HYDROCHLORIDE 10 MG/ML
10 INJECTION, SOLUTION INFILTRATION; PERINEURAL ONCE
Status: COMPLETED | OUTPATIENT
Start: 2024-10-17 | End: 2024-10-17

## 2024-10-17 RX ORDER — LIDOCAINE/PRILOCAINE 2.5 %-2.5%
CREAM (GRAM) TOPICAL ONCE
Qty: 30 G | Refills: 0 | Status: SHIPPED | OUTPATIENT
Start: 2024-10-17 | End: 2024-10-17

## 2024-10-17 RX ADMIN — LIDOCAINE HYDROCHLORIDE 10 ML: 10 INJECTION, SOLUTION INFILTRATION; PERINEURAL at 17:56

## 2024-10-17 NOTE — ED PROVIDER NOTES
"Pt Name: Latanya Morgan  MRN: [id number]  : 1995  Date of Encounter: 10/17/2024    PCP: Juju Mcneil MD      Subjective    History of Present Illness:    Chief Complaint: Right great toe pain    History of Present Illness: Latanya Morgan is a 29 y.o. female who presents to the ER complaining of right great toe pain that started 2 to 3 days ago and is progressively worsened over the interval.  Patient states she had painful ingrown nail and had it cut out at the local urgent treatment.  Patient states after that she had swelling, erythema and discharge develop.  Pain is described as Throbbing, Constant, and does not radiate  Patient rates pain as a 8 on a ten scale.    Triage Vitals:    ED Triage Vitals [10/17/24 1738]   Temp Heart Rate Resp BP SpO2   98 °F (36.7 °C) 113 18 131/100 100 %      Temp src Heart Rate Source Patient Position BP Location FiO2 (%)   -- -- -- -- --       Nurses Notes reviewed and agree, including vitals, allergies, social history and prior medical history.     Patient has no known allergies.    Past Medical History:   Diagnosis Date    Abnormal Pap smear of cervix     Anxiety     Arthritis     Body piercing     ears and nose    Depression     Dysphagia     Patient reported \"sometimes feels like things won't go down\"    GERD (gastroesophageal reflux disease)     History of fracture     Coccyx and left arm    History of Holter monitoring 2018    states was WNL    Migraine     Seasonal allergies     Tattoos     Wears glasses        Past Surgical History:   Procedure Laterality Date    ADENOIDECTOMY  2003    APPENDECTOMY       SECTION  22    CHOLECYSTECTOMY      CHOLECYSTECTOMY WITH INTRAOPERATIVE CHOLANGIOGRAM N/A 07/10/2020    Procedure: CHOLECYSTECTOMY LAPAROSCOPIC INTRAOPERATIVE CHOLANGIOGRAM;  Surgeon: Leonor Kuhn MD;  Location: [facility];  Service: General;  Laterality: N/A;    DIAGNOSTIC LAPAROSCOPY N/A 2018    Procedure: " "DIAGNOSTIC LAPAROSCOPY WITH EXCISION OF LEFT OVARIAN CYST, LAPAROSCOPY  APPENDECTOMY;  Surgeon: Leonor Kuhn MD;  Location: [facility];  Service:     ENDOSCOPY N/A 12/04/2019    Procedure: ESOPHAGOGASTRODUODENOSCOPY;  Surgeon: Leonor Kuhn MD;  Location: [facility];  Service: Gastroenterology    HAND SURGERY Right 2016    thumb - reported hardware remains intact    OVARIAN CYST REMOVAL      SKIN BIOPSY      moles-benign    TONSILLECTOMY         Social History     Socioeconomic History    Marital status:     Highest education level: Some college, no degree   Tobacco Use    Smoking status: Former     Current packs/day: 0.00     Average packs/day: 0.3 packs/day for 4.0 years (1.0 ttl pk-yrs)     Types: Cigarettes, Electronic Cigarette     Start date: 2018     Quit date: 2021     Years since quitting: 3.7    Smokeless tobacco: Never   Vaping Use    Vaping status: Former   Substance and Sexual Activity    Alcohol use: Not Currently     Alcohol/week: 1.0 standard drink of alcohol     Types: 1 Glasses of wine per week     Comment: \"couple of beers once a week\" - reported no etoh abuse    Drug use: No    Sexual activity: Yes     Partners: Male     Birth control/protection: I.U.D.       Family History   Problem Relation Age of Onset    Migraines Mother     Anxiety disorder Mother     Hypertension Maternal Grandmother     Anxiety disorder Maternal Grandmother     Arthritis Maternal Grandfather        REVIEW OF SYSTEMS:     All systems reviewed and not pertinent unless noted.    Review of Systems   Musculoskeletal:  Positive for joint swelling and myalgias.   Skin:  Positive for wound.       Objective    Physical Exam  Vitals and nursing note reviewed.   Constitutional:       Appearance: Normal appearance.   HENT:      Head: Normocephalic and atraumatic.   Eyes:      Extraocular Movements: Extraocular movements intact.      Pupils: Pupils are equal, round, and reactive to light.   Cardiovascular:      Rate " and Rhythm: Normal rate and regular rhythm.      Pulses: Normal pulses.      Heart sounds: Normal heart sounds.   Pulmonary:      Effort: Pulmonary effort is normal.      Breath sounds: Normal breath sounds.   Abdominal:      General: Abdomen is flat. Bowel sounds are normal.      Palpations: Abdomen is soft.   Musculoskeletal:         General: Swelling and tenderness present.      Cervical back: Normal range of motion and neck supple.   Skin:     Capillary Refill: Capillary refill takes less than 2 seconds.      Findings: Ecchymosis, erythema and wound present.   Neurological:      General: No focal deficit present.      Mental Status: She is alert and oriented to person, place, and time. Mental status is at baseline.      GCS: GCS eye subscore is 4. GCS verbal subscore is 5. GCS motor subscore is 6.      Sensory: Sensation is intact.      Motor: Motor function is intact.      Gait: Gait is intact.   Psychiatric:         Attention and Perception: Attention and perception normal.         Mood and Affect: Mood and affect normal.         Speech: Speech normal.         Behavior: Behavior normal. Behavior is cooperative.                           Wound Care    Date/Time: 10/17/2024 8:45 PM    Performed by: Mitch Hills APRN  Authorized by: Joe Chiang MD    Consent:     Consent obtained:  Verbal    Consent given by:  Patient    Risks discussed:  Bleeding, infection, pain, incomplete drainage, nerve damage and poor cosmetic result  Universal protocol:     Procedure explained and questions answered to patient or proxy's satisfaction: yes      Relevant documents present and verified: yes      Site/side marked: yes      Immediately prior to procedure, a time out was called: yes      Patient identity confirmed:  Verbally with patient  Anesthesia:     Anesthesia method:  Local infiltration    Local anesthetic:  Lidocaine 1% w/o epi  Procedure details:     Wound location:  Toe    Toe location:  R big toe     Debridement performed: Yes    Comments:      Wound was soaked in chlorhexidine, then debrided with blunt dissection no foreign material noted, wound did have some purulent draining prior to wound care      ED Course:    No orders to display            Orders placed during this visit:    Orders Placed This Encounter   Procedures    Wound Care       LAB Results:    Lab Results (last 24 hours)       ** No results found for the last 24 hours. **             If labs were ordered, I have independently reviewed the results and considered them in the diagnosis and treatment plan for the patient    RADIOLOGY    No radiology results from the last 24 hrs     If I have ordered, I have independently reviewed the above noted radiographic studies.  Please see the radiologist dictation for the official interpretation    Medications given to patient in the ER    Medications   lidocaine (XYLOCAINE) 1 % injection 10 mL (10 mL Injection Given by Other 10/17/24 1758)       AS OF 20:47 EDT VITALS:    BP - 131/100  HR - 113  TEMP - 98 °F (36.7 °C)  O2 SATS - 100%         Shared Decision Making: After my consideration of the clinical presentation and laboratory/radiology studies obtained, I have discussed the findings with the patient/patient representative who is in agreement with the treatment plan and final disposition. Risks and benefits of discharge and/or observation admission were discussed.  Final disposition of the patient will be discharged home.  Patient is requested to follow-up with primary care provider and specialist in 1 week following final discharge.      Medical Decision Making      Final diagnoses:   Paronychia of great toe of right foot       Please note that portions of this document were completed using voice recognition dictation software.       Mitch Hills, NINFA  10/17/24 8387

## 2024-10-18 ENCOUNTER — OFFICE VISIT (OUTPATIENT)
Dept: SURGERY | Facility: CLINIC | Age: 29
End: 2024-10-18
Payer: COMMERCIAL

## 2024-10-18 VITALS
DIASTOLIC BLOOD PRESSURE: 62 MMHG | BODY MASS INDEX: 19.69 KG/M2 | SYSTOLIC BLOOD PRESSURE: 100 MMHG | OXYGEN SATURATION: 96 % | WEIGHT: 107 LBS | HEART RATE: 111 BPM | TEMPERATURE: 100 F | HEIGHT: 62 IN

## 2024-10-18 DIAGNOSIS — L60.0 INGROWN TOENAIL: Primary | ICD-10-CM

## 2024-10-18 NOTE — PROGRESS NOTES
Patient: Latanya Morgan    YOB: 1995    Date: 10/18/2024    Primary Care Provider: Juju Mcneil MD    Chief Complaint   Patient presents with    Ingrown Toenail       SUBJECTIVE:    History of present illness:  The patient is in the office today for evaluation and treatment of right ingrown toenail.  Area was attempted to be repaired yesterday in the ER, still has residual bone and tissue in the nail with severe inflammation and drainage.    The following portions of the patient's history were reviewed and updated as appropriate: allergies, current medications, past family history, past medical history, past social history, past surgical history and problem list.      Review of Systems   Constitutional:  Negative for chills, fever and unexpected weight change.   HENT:  Negative for hearing loss, trouble swallowing and voice change.    Eyes:  Negative for visual disturbance.   Respiratory:  Negative for apnea, cough, chest tightness, shortness of breath and wheezing.    Cardiovascular:  Negative for chest pain, palpitations and leg swelling.   Gastrointestinal:  Negative for abdominal distention, abdominal pain, anal bleeding, blood in stool, constipation, diarrhea, nausea, rectal pain and vomiting.   Endocrine: Negative for cold intolerance and heat intolerance.   Genitourinary:  Negative for difficulty urinating, dysuria and flank pain.   Musculoskeletal:  Negative for back pain and gait problem.   Skin:  Negative for color change, rash and wound.   Neurological:  Negative for dizziness, syncope, speech difficulty, weakness, light-headedness, numbness and headaches.   Hematological:  Negative for adenopathy. Does not bruise/bleed easily.   Psychiatric/Behavioral:  Negative for confusion. The patient is not nervous/anxious.          Allergies:  No Known Allergies    Medications:    Current Outpatient Medications:     adapalene (DIFFERIN) 0.3 % gel, Apply a pea size amount to the face  "at bedtime., Disp: 45 g, Rfl: 1    Alcohol Swabs (Alcohol Pads) 70 % pads, Check sugars as needed/directed for hypoglycemia., Disp: 100 each, Rfl: 12    Blood Glucose Monitoring Suppl (Contour Next One) device, Use as directed to test blood glucose once daily, Disp: 1 each, Rfl: 0    fluticasone (FLONASE) 50 MCG/ACT nasal spray, 2 sprays into the nostril(s) as directed by provider Daily., Disp: 9.9 mL, Rfl: 0    glucose blood (Contour Next Test) test strip, Use as directed to test blood glucose once daily, Disp: 100 each, Rfl: 12    ketoconazole (NIZORAL) 2 % cream, Apply a thin layer to the face twice daily as needed for flaking., Disp: 60 g, Rfl: 11    ketoconazole (NIZORAL) 2 % shampoo, Wash scalp 2-3 times a week. Let sit 5 mins before rinsing. Follow with regular shampoo., Disp: 120 mL, Rfl: 11    lisdexamfetamine (Vyvanse) 50 MG capsule, Take 1 capsule by mouth Every Morning for One week. Pcp is out, Disp: 7 capsule, Rfl: 0    Microlet Lancets misc, Use as directed to test blood glucose once daily, Disp: 100 each, Rfl: 12    Mirena, 52 MG, 20 MCG/DAY intrauterine device IUD, , Disp: , Rfl:     sulfamethoxazole-trimethoprim (Bactrim DS) 800-160 MG per tablet, Take 1 tablet by mouth 2 (Two) Times a Day., Disp: 14 tablet, Rfl: 0  No current facility-administered medications for this visit.    History:  Past Medical History:   Diagnosis Date    Abnormal Pap smear of cervix     Anxiety     Arthritis     Body piercing     ears and nose    Depression     Dysphagia     Patient reported \"sometimes feels like things won't go down\"    GERD (gastroesophageal reflux disease)     History of fracture     Coccyx and left arm    History of Holter monitoring 2018    states was WNL    Migraine     Seasonal allergies     Tattoos     Wears glasses        Past Surgical History:   Procedure Laterality Date    ADENOIDECTOMY  2003    APPENDECTOMY       SECTION  22    CHOLECYSTECTOMY      CHOLECYSTECTOMY WITH " "INTRAOPERATIVE CHOLANGIOGRAM N/A 07/10/2020    Procedure: CHOLECYSTECTOMY LAPAROSCOPIC INTRAOPERATIVE CHOLANGIOGRAM;  Surgeon: Leonor Kuhn MD;  Location: [facility] OR;  Service: General;  Laterality: N/A;    DIAGNOSTIC LAPAROSCOPY N/A 01/04/2018    Procedure: DIAGNOSTIC LAPAROSCOPY WITH EXCISION OF LEFT OVARIAN CYST, LAPAROSCOPY  APPENDECTOMY;  Surgeon: Leonor Kuhn MD;  Location: [facility] OR;  Service:     ENDOSCOPY N/A 12/04/2019    Procedure: ESOPHAGOGASTRODUODENOSCOPY;  Surgeon: Leonor Kuhn MD;  Location: [facility] ENDOSCOPY;  Service: Gastroenterology    HAND SURGERY Right 2016    thumb - reported hardware remains intact    OVARIAN CYST REMOVAL      SKIN BIOPSY      moles-benign    TONSILLECTOMY         Family History   Problem Relation Age of Onset    Migraines Mother     Anxiety disorder Mother     Hypertension Maternal Grandmother     Anxiety disorder Maternal Grandmother     Arthritis Maternal Grandfather        Social History     Tobacco Use    Smoking status: Former     Current packs/day: 0.00     Average packs/day: 0.3 packs/day for 4.0 years (1.0 ttl pk-yrs)     Types: Cigarettes, Electronic Cigarette     Start date: 2018     Quit date: 2021     Years since quitting: 3.7    Smokeless tobacco: Never   Vaping Use    Vaping status: Former   Substance Use Topics    Alcohol use: Not Currently     Alcohol/week: 1.0 standard drink of alcohol     Types: 1 Glasses of wine per week     Comment: \"couple of beers once a week\" - reported no etoh abuse    Drug use: No        OBJECTIVE:    Vital Signs:   Vitals:    10/18/24 1336   BP: 100/62   Pulse: 111   Temp: 100 °F (37.8 °C)   SpO2: 96%   Weight: 48.5 kg (107 lb)   Height: 157.5 cm (62.01\")       Physical Exam:       Extremity-ingrown toenail right medial big toe.    Results Review:   I reviewed the patient's new clinical results.    Review of Systems was reviewed and confirmed as accurate as documented by the MA.    ASSESSMENT/PLAN:    1. Ingrown toenail  "       The right big toe was prepped in normal fashion, 1% lidocaine injected.  After adequate anesthesia, I was able to lift the entire right toenail off the nail matrix.  I then divided with a scissors until I had the entire area of this ingrown removed.  Placed pressure Neosporin and dressing.  His daughter procedure well with no complications.          Electronically signed by Leonor Kuhn MD  10/18/24

## 2024-10-23 ENCOUNTER — OFFICE VISIT (OUTPATIENT)
Dept: INTERNAL MEDICINE | Facility: CLINIC | Age: 29
End: 2024-10-23
Payer: COMMERCIAL

## 2024-10-23 VITALS
TEMPERATURE: 97.4 F | OXYGEN SATURATION: 98 % | DIASTOLIC BLOOD PRESSURE: 70 MMHG | HEART RATE: 102 BPM | BODY MASS INDEX: 19.36 KG/M2 | WEIGHT: 105.2 LBS | SYSTOLIC BLOOD PRESSURE: 122 MMHG | HEIGHT: 62 IN

## 2024-10-23 DIAGNOSIS — G89.29 CHRONIC ABDOMINAL PAIN: ICD-10-CM

## 2024-10-23 DIAGNOSIS — E53.8 VITAMIN B12 DEFICIENCY: ICD-10-CM

## 2024-10-23 DIAGNOSIS — K59.09 CHRONIC CONSTIPATION: ICD-10-CM

## 2024-10-23 DIAGNOSIS — R79.9 ABNORMAL BLOOD CHEMISTRY: ICD-10-CM

## 2024-10-23 DIAGNOSIS — R10.9 CHRONIC ABDOMINAL PAIN: ICD-10-CM

## 2024-10-23 DIAGNOSIS — R19.8 ANAL SYMPTOMS: ICD-10-CM

## 2024-10-23 DIAGNOSIS — R10.2 PELVIC PAIN: ICD-10-CM

## 2024-10-23 DIAGNOSIS — Z23 NEED FOR INFLUENZA VACCINATION: ICD-10-CM

## 2024-10-23 DIAGNOSIS — F50.819 BINGE EATING DISORDER, UNSPECIFIED SEVERITY: ICD-10-CM

## 2024-10-23 DIAGNOSIS — F90.9 ATTENTION DEFICIT HYPERACTIVITY DISORDER (ADHD), UNSPECIFIED ADHD TYPE: Primary | ICD-10-CM

## 2024-10-23 DIAGNOSIS — F41.9 ANXIETY: ICD-10-CM

## 2024-10-23 PROCEDURE — 90471 IMMUNIZATION ADMIN: CPT | Performed by: FAMILY MEDICINE

## 2024-10-23 PROCEDURE — 99214 OFFICE O/P EST MOD 30 MIN: CPT | Performed by: FAMILY MEDICINE

## 2024-10-23 PROCEDURE — 90656 IIV3 VACC NO PRSV 0.5 ML IM: CPT | Performed by: FAMILY MEDICINE

## 2024-10-23 RX ORDER — LISDEXAMFETAMINE DIMESYLATE 50 MG/1
50 CAPSULE ORAL EVERY MORNING
Qty: 30 CAPSULE | Refills: 0 | Status: SHIPPED | OUTPATIENT
Start: 2024-10-23

## 2024-10-23 NOTE — ASSESSMENT & PLAN NOTE
Trial of Linzess in place of Miralax. May need dose adjusted. GI consult, likely needs colonoscopy.

## 2024-10-23 NOTE — PROGRESS NOTES
"Chief Complaint  ADHD    Subjective        Latanya Cecilia Morgan presents to [facility]  History of Present Illness  Vyvanse helps with focus/organization.  Wants to gain weight, focus on protein.  Nurse--busy schedule.   No longer binging but on medicine.  Lost about 15 lb since starting med but doesn't want to go lower.  103-108 range.   Chronic abdominal pain, constipation, wonders about anal stricture. Struggled early in pregnancy with trying to get stools out and had subchorionic hemorrhoid. No blood. Has an uncomfortable feeling in the anal area, feels she cannot relax it. Increased mucus in stools when she gets stool out. Miralax daily. Doesn't go if she doesn't do daily. Watery stools though.       Objective   Vital Signs:  /70   Pulse 102   Temp 97.4 °F (36.3 °C)   Ht 157.5 cm (62.01\")   Wt 47.7 kg (105 lb 3.2 oz)   SpO2 98%   BMI 19.24 kg/m²   Estimated body mass index is 19.24 kg/m² as calculated from the following:    Height as of this encounter: 157.5 cm (62.01\").    Weight as of this encounter: 47.7 kg (105 lb 3.2 oz).    BMI is within normal parameters. No other follow-up for BMI required.      Physical Exam  Vitals and nursing note reviewed.   Constitutional:       General: She is not in acute distress.     Appearance: Normal appearance. She is well-developed, well-groomed and normal weight. She is not ill-appearing, toxic-appearing or diaphoretic.   HENT:      Head: Normocephalic and atraumatic.      Right Ear: Hearing normal.      Left Ear: Hearing normal.   Eyes:      General: Lids are normal. No scleral icterus.     Extraocular Movements: Extraocular movements intact.   Neck:      Trachea: Phonation normal.   Pulmonary:      Effort: Pulmonary effort is normal.   Abdominal:      General: Bowel sounds are normal.      Palpations: Abdomen is soft. There is no mass.      Tenderness: There is no abdominal tenderness.   Musculoskeletal:      Cervical back: " Neck supple.   Skin:     Coloration: Skin is not jaundiced or pale.   Neurological:      General: No focal deficit present.      Mental Status: She is alert and oriented to person, place, and time.      Motor: Motor function is intact.   Psychiatric:         Attention and Perception: Attention and perception normal.         Mood and Affect: Affect normal. Mood is anxious.         Speech: Speech normal.         Behavior: Behavior normal. Behavior is cooperative.         Thought Content: Thought content normal.         Cognition and Memory: Cognition and memory normal.         Judgment: Judgment normal.        Result Review :  The following data was reviewed by: Juju Mcneil MD on 10/23/2024:  ToxAssure Flex 23, Ur - Urine, Clean Catch (03/12/2024 08:39)   XR Abdomen KUB (09/28/2023 16:10)   Celiac Disease Panel (08/09/2017 17:31)  negative         Assessment and Plan   Diagnoses and all orders for this visit:    1. Attention deficit hyperactivity disorder (ADHD), unspecified ADHD type (Primary)  -     lisdexamfetamine (Vyvanse) 50 MG capsule; Take 1 capsule by mouth Every Morning  Dispense: 30 capsule; Refill: 0  -     Vitamin B12 & Folate    2. Binge eating disorder, unspecified severity  -     lisdexamfetamine (Vyvanse) 50 MG capsule; Take 1 capsule by mouth Every Morning  Dispense: 30 capsule; Refill: 0    3. Chronic constipation  Assessment & Plan:  Trial of Linzess in place of Miralax. May need dose adjusted. GI consult, likely needs colonoscopy.    Orders:  -     linaclotide (Linzess) 72 MCG capsule capsule; Take 1 capsule by mouth Every Morning Before Breakfast.  Dispense: 30 capsule; Refill: 3  -     Ambulatory Referral to Gastroenterology  -     TSH+Free T4  -     Inflammatory Bowel Disease Panel    4. Chronic abdominal pain  -     Ambulatory Referral to Gastroenterology  -     Inflammatory Bowel Disease Panel    5. Pelvic pain  -     US Pelvis Complete; Future    6. Anal symptoms  -     Ambulatory  Referral to Gastroenterology    7. Abnormal blood chemistry  -     Vitamin B12 & Folate  -     Comprehensive Metabolic Panel  -     CBC (No Diff)  -     TSH+Free T4  -     Inflammatory Bowel Disease Panel    8. Anxiety  -     Vitamin B12 & Folate  -     TSH+Free T4    9. Vitamin B12 deficiency  -     Vitamin B12 & Folate    10. Need for influenza vaccination  -     Fluzone >6mos ([id number])             Follow Up   Return in about 3 months (around 1/23/2025) for Controlled Rx Follow Up.  Patient was given instructions and counseling regarding her condition or for health maintenance advice. Please see specific information pulled into the AVS if appropriate.

## 2024-10-24 ENCOUNTER — TELEPHONE (OUTPATIENT)
Dept: INTERNAL MEDICINE | Facility: CLINIC | Age: 29
End: 2024-10-24
Payer: COMMERCIAL

## 2024-10-24 LAB
ALBUMIN SERPL-MCNC: 4.9 G/DL (ref 3.5–5.2)
ALBUMIN/GLOB SERPL: 1.8 G/DL
ALP SERPL-CCNC: 76 U/L (ref 39–117)
ALT SERPL-CCNC: 12 U/L (ref 1–33)
AST SERPL-CCNC: 16 U/L (ref 1–32)
BAKER'S YEAST IGA QN: <20 UNITS (ref 0–24.9)
BAKER'S YEAST IGG QN: <20 UNITS (ref 0–24.9)
BILIRUB SERPL-MCNC: 0.6 MG/DL (ref 0–1.2)
BUN SERPL-MCNC: 11 MG/DL (ref 6–20)
BUN/CREAT SERPL: 11.1 (ref 7–25)
CALCIUM SERPL-MCNC: 9.7 MG/DL (ref 8.6–10.5)
CHLORIDE SERPL-SCNC: 100 MMOL/L (ref 98–107)
CO2 SERPL-SCNC: 25.6 MMOL/L (ref 22–29)
CREAT SERPL-MCNC: 0.99 MG/DL (ref 0.57–1)
EGFRCR SERPLBLD CKD-EPI 2021: 80.3 ML/MIN/1.73
ERYTHROCYTE [DISTWIDTH] IN BLOOD BY AUTOMATED COUNT: 12.3 % (ref 12.3–15.4)
FOLATE SERPL-MCNC: 5.46 NG/ML (ref 4.78–24.2)
GLOBULIN SER CALC-MCNC: 2.7 GM/DL
GLUCOSE SERPL-MCNC: 91 MG/DL (ref 65–99)
HCT VFR BLD AUTO: 41.9 % (ref 34–46.6)
HGB BLD-MCNC: 14.5 G/DL (ref 12–15.9)
MCH RBC QN AUTO: 30.8 PG (ref 26.6–33)
MCHC RBC AUTO-ENTMCNC: 34.6 G/DL (ref 31.5–35.7)
MCV RBC AUTO: 89 FL (ref 79–97)
P-ANCA ATYPICAL TITR SER IF: NORMAL TITER
PLATELET # BLD AUTO: 326 10*3/MM3 (ref 140–450)
POTASSIUM SERPL-SCNC: 4.3 MMOL/L (ref 3.5–5.2)
PROT SERPL-MCNC: 7.6 G/DL (ref 6–8.5)
RBC # BLD AUTO: 4.71 10*6/MM3 (ref 3.77–5.28)
SODIUM SERPL-SCNC: 138 MMOL/L (ref 136–145)
T4 FREE SERPL-MCNC: 1.16 NG/DL (ref 0.92–1.68)
TSH SERPL DL<=0.005 MIU/L-ACNC: 3.81 UIU/ML (ref 0.27–4.2)
VIT B12 SERPL-MCNC: 398 PG/ML (ref 211–946)
WBC # BLD AUTO: 3.87 10*3/MM3 (ref 3.4–10.8)

## 2024-11-22 DIAGNOSIS — F50.819 BINGE EATING DISORDER, UNSPECIFIED SEVERITY: ICD-10-CM

## 2024-11-22 DIAGNOSIS — F90.9 ATTENTION DEFICIT HYPERACTIVITY DISORDER (ADHD), UNSPECIFIED ADHD TYPE: ICD-10-CM

## 2024-11-22 RX ORDER — LISDEXAMFETAMINE DIMESYLATE 50 MG/1
50 CAPSULE ORAL EVERY MORNING
Qty: 30 CAPSULE | Refills: 0 | Status: SHIPPED | OUTPATIENT
Start: 2024-11-22

## 2024-12-23 ENCOUNTER — PATIENT MESSAGE (OUTPATIENT)
Dept: INTERNAL MEDICINE | Facility: CLINIC | Age: 29
End: 2024-12-23
Payer: COMMERCIAL

## 2024-12-23 DIAGNOSIS — F90.9 ATTENTION DEFICIT HYPERACTIVITY DISORDER (ADHD), UNSPECIFIED ADHD TYPE: ICD-10-CM

## 2024-12-23 DIAGNOSIS — F50.819 BINGE EATING DISORDER, UNSPECIFIED SEVERITY: ICD-10-CM

## 2024-12-23 RX ORDER — LISDEXAMFETAMINE DIMESYLATE 50 MG/1
50 CAPSULE ORAL EVERY MORNING
Qty: 30 CAPSULE | Refills: 0 | Status: SHIPPED | OUTPATIENT
Start: 2024-12-23 | End: 2024-12-26 | Stop reason: SDUPTHER

## 2024-12-26 ENCOUNTER — PATIENT MESSAGE (OUTPATIENT)
Dept: INTERNAL MEDICINE | Facility: CLINIC | Age: 29
End: 2024-12-26
Payer: COMMERCIAL

## 2024-12-26 DIAGNOSIS — F50.819 BINGE EATING DISORDER, UNSPECIFIED SEVERITY: ICD-10-CM

## 2024-12-26 DIAGNOSIS — F90.9 ATTENTION DEFICIT HYPERACTIVITY DISORDER (ADHD), UNSPECIFIED ADHD TYPE: ICD-10-CM

## 2024-12-26 RX ORDER — LISDEXAMFETAMINE DIMESYLATE 50 MG/1
50 CAPSULE ORAL EVERY MORNING
Qty: 30 CAPSULE | Refills: 0 | OUTPATIENT
Start: 2024-12-26

## 2024-12-26 RX ORDER — LISDEXAMFETAMINE DIMESYLATE 50 MG/1
50 CAPSULE ORAL EVERY MORNING
Qty: 30 CAPSULE | Refills: 0 | Status: SHIPPED | OUTPATIENT
Start: 2024-12-26

## 2025-01-20 NOTE — PROGRESS NOTES
"Patient: Latanya Morgan  YOB: 1995    Date: 01/21/2025    Primary Care Provider: Juju Mcneil MD    Chief Complaint   Patient presents with    Follow-up       History:  patient states that she continues to have constipation complaints.  Sometimes has right-sided pain.  She is taken multiple remedies recently including a soapsuds enema without significant help.  She is on Linzess.    The following portions of the patient's history were reviewed and updated as appropriate: allergies, current medications, past family history, past medical history, past social history, past surgical history and problem list.    Review of Systems   Constitutional:  Negative for chills, fever and unexpected weight change.   HENT:  Negative for hearing loss, trouble swallowing and voice change.    Eyes:  Negative for visual disturbance.   Respiratory:  Negative for apnea, cough, chest tightness, shortness of breath and wheezing.    Cardiovascular:  Negative for chest pain, palpitations and leg swelling.   Gastrointestinal:  Positive for constipation. Negative for abdominal distention, abdominal pain, anal bleeding, blood in stool, nausea, rectal pain and vomiting.   Endocrine: Negative for cold intolerance and heat intolerance.   Genitourinary:  Negative for difficulty urinating, dysuria and flank pain.   Musculoskeletal:  Negative for back pain and gait problem.   Skin:  Negative for color change, rash and wound.   Neurological:  Negative for dizziness, syncope, speech difficulty, weakness, light-headedness, numbness and headaches.   Hematological:  Negative for adenopathy. Does not bruise/bleed easily.   Psychiatric/Behavioral:  Negative for confusion. The patient is not nervous/anxious.        Vital Signs  Vitals:    01/21/25 0949   BP: 120/76   Pulse: 87   Temp: 97.5 °F (36.4 °C)   SpO2: 100%   Weight: 48.5 kg (107 lb)   Height: 157.5 cm (62.01\")       Allergies:  No Known " Allergies    Medications:    Current Outpatient Medications:     adapalene (DIFFERIN) 0.3 % gel, Apply a pea size amount to the face at bedtime., Disp: 45 g, Rfl: 1    Alcohol Swabs (Alcohol Pads) 70 % pads, Check sugars as needed/directed for hypoglycemia., Disp: 100 each, Rfl: 12    Blood Glucose Monitoring Suppl (Contour Next One) device, Use as directed to test blood glucose once daily, Disp: 1 each, Rfl: 0    glucose blood (Contour Next Test) test strip, Use as directed to test blood glucose once daily, Disp: 100 each, Rfl: 12    ketoconazole (NIZORAL) 2 % cream, Apply a thin layer to the face twice daily as needed for flaking., Disp: 60 g, Rfl: 11    ketoconazole (NIZORAL) 2 % shampoo, Wash scalp 2-3 times a week. Let sit 5 mins before rinsing. Follow with regular shampoo., Disp: 120 mL, Rfl: 11    linaclotide (Linzess) 72 MCG capsule capsule, Take 1 capsule by mouth Every Morning Before Breakfast., Disp: 30 capsule, Rfl: 3    lisdexamfetamine (Vyvanse) 50 MG capsule, Take 1 capsule by mouth Every Morning, Disp: 30 capsule, Rfl: 0    Microlet Lancets misc, Use as directed to test blood glucose once daily, Disp: 100 each, Rfl: 12    Mirena, 52 MG, 20 MCG/DAY intrauterine device IUD, , Disp: , Rfl:     Physical Exam:    General Appearance:    Alert, cooperative, in no acute distress   Head:    Normocephalic, without obvious abnormality, atraumatic   Eyes:            Normal.  No scleral icterus.  PERRLA    Lungs:     Clear to auscultation,respirations regular, even and                  unlabored    Heart:    Regular rhythm and normal rate, normal S1 and S2, no            murmur   Abdomen:   Soft and nontender.  No palpable mass and the colon is normal on exam without palpable stool.   Extremities:   Moves all extremities well, no edema, no cyanosis, no             redness   Skin:   No bleeding, bruising or rash   Neurologic:   Normal without gross deficits.   Psychiatric: No evidence of depression or anxiety            Results Review:   I reviewed the patient's new clinical results.     Review of Systems was reviewed and confirmed as accurate as documented by the MA.    ASSESSMENT/PLAN:    1. Chronic idiopathic constipation       I will go ahead and order a KUB.  I will hold off on a colonoscopy for now since I explained to her that it is low yield.  Certainly if she continues to have issues we may consider a colonoscopy.  Linzess can also be increased to full dose as this did help in the past.    Electronically signed by Donovan Reyna MD  01/21/25

## 2025-01-21 ENCOUNTER — TELEPHONE (OUTPATIENT)
Dept: INTERNAL MEDICINE | Facility: CLINIC | Age: 30
End: 2025-01-21
Payer: COMMERCIAL

## 2025-01-21 ENCOUNTER — HOSPITAL ENCOUNTER (OUTPATIENT)
Dept: GENERAL RADIOLOGY | Facility: HOSPITAL | Age: 30
Discharge: HOME | End: 2025-01-21
Admitting: SURGERY
Payer: COMMERCIAL

## 2025-01-21 ENCOUNTER — OFFICE VISIT (OUTPATIENT)
Dept: SURGERY | Facility: CLINIC | Age: 30
End: 2025-01-21
Payer: COMMERCIAL

## 2025-01-21 VITALS
HEIGHT: 62 IN | BODY MASS INDEX: 19.69 KG/M2 | OXYGEN SATURATION: 100 % | SYSTOLIC BLOOD PRESSURE: 120 MMHG | WEIGHT: 107 LBS | TEMPERATURE: 97.5 F | HEART RATE: 87 BPM | DIASTOLIC BLOOD PRESSURE: 76 MMHG

## 2025-01-21 DIAGNOSIS — K59.04 CHRONIC IDIOPATHIC CONSTIPATION: Primary | ICD-10-CM

## 2025-01-21 DIAGNOSIS — K59.04 CHRONIC IDIOPATHIC CONSTIPATION: ICD-10-CM

## 2025-01-21 PROCEDURE — 99213 OFFICE O/P EST LOW 20 MIN: CPT | Performed by: SURGERY

## 2025-01-21 PROCEDURE — 74018 RADEX ABDOMEN 1 VIEW: CPT

## 2025-01-21 NOTE — TELEPHONE ENCOUNTER
"Relay     \"Pt is scheduled for a appointment to talk about bowel issues, pt can not be seen for his issue because she saw general surgery today and her insurance will not pay or her to be seen twice in one day with the same issue. Pt needs to keep her appointment on 1-23 with Dr. Mcneil for med refills as well. \"                 "

## 2025-01-22 ENCOUNTER — TELEPHONE (OUTPATIENT)
Dept: SURGERY | Facility: CLINIC | Age: 30
End: 2025-01-22
Payer: COMMERCIAL

## 2025-01-22 NOTE — TELEPHONE ENCOUNTER
Donovan Reyna MD to You  16 hours ago  X-ray does not show any significant constipation.  Actually there is little to no stool in most of her colon particularly the rectum and the left side.  I do not recommend any further enemas.  She can consider a magnesium supplement such as magnesium citrate daily if she begins to have constipation again.  Another option would be to increase the Linzess dose if needed since she has used multiple other remedies without success.  Have her call me or follow-up with me if any questions.      Called patient with no answer left a VM for patient to call our office back.       Patient called back she is aware and verbally understood. She doesn't have any questions at this time.

## 2025-01-23 ENCOUNTER — OFFICE VISIT (OUTPATIENT)
Dept: INTERNAL MEDICINE | Facility: CLINIC | Age: 30
End: 2025-01-23
Payer: COMMERCIAL

## 2025-01-23 VITALS
WEIGHT: 107.6 LBS | TEMPERATURE: 98.4 F | BODY MASS INDEX: 19.8 KG/M2 | OXYGEN SATURATION: 100 % | HEIGHT: 62 IN | SYSTOLIC BLOOD PRESSURE: 110 MMHG | DIASTOLIC BLOOD PRESSURE: 68 MMHG | HEART RATE: 60 BPM

## 2025-01-23 DIAGNOSIS — M62.89 PELVIC FLOOR DYSFUNCTION: ICD-10-CM

## 2025-01-23 DIAGNOSIS — K59.09 CHRONIC CONSTIPATION: ICD-10-CM

## 2025-01-23 DIAGNOSIS — F50.819 BINGE EATING DISORDER, UNSPECIFIED SEVERITY: ICD-10-CM

## 2025-01-23 DIAGNOSIS — F90.9 ATTENTION DEFICIT HYPERACTIVITY DISORDER (ADHD), UNSPECIFIED ADHD TYPE: Primary | ICD-10-CM

## 2025-01-23 PROCEDURE — 99214 OFFICE O/P EST MOD 30 MIN: CPT | Performed by: FAMILY MEDICINE

## 2025-01-23 RX ORDER — LISDEXAMFETAMINE DIMESYLATE 60 MG/1
60 CAPSULE ORAL EVERY MORNING
Start: 2025-01-23

## 2025-01-23 RX ORDER — DEXTROAMPHETAMINE SACCHARATE, AMPHETAMINE ASPARTATE MONOHYDRATE, DEXTROAMPHETAMINE SULFATE AND AMPHETAMINE SULFATE 6.25; 6.25; 6.25; 6.25 MG/1; MG/1; MG/1; MG/1
25 CAPSULE, EXTENDED RELEASE ORAL EVERY MORNING
Qty: 7 CAPSULE | Refills: 0 | Status: SHIPPED | OUTPATIENT
Start: 2025-01-23

## 2025-01-23 NOTE — ASSESSMENT & PLAN NOTE
Vyvanse may be worsening constipation  Patient would like to try Adderall xr to see if it helps ADHD but possibly less constipation issues, 7 day rx sent  If does not want to stay on Adderall xr I can change back to Vyvanse, kept on med list but at 60 mg which would be dose I'd send next time.  UDS due next visit.

## 2025-01-23 NOTE — PATIENT INSTRUCTIONS
Living With Attention Deficit Hyperactivity Disorder  If you have been diagnosed with attention deficit hyperactivity disorder (ADHD), you may be relieved that you now know why you have felt or behaved a certain way. Still, you may feel overwhelmed about the treatment ahead. You may also wonder how to get the support you need and how to deal with the condition day-to-day. With treatment and support, you can live with ADHD and manage your symptoms.  How to manage lifestyle changes  Managing lifestyle changes can be challenging. Seeking support from your healthcare provider, therapist, family, and friends can be helpful.  How to recognize changes in your condition  The following signs may mean that your treatment is working well and your condition is improving:  Consistently being on time for appointments.  Being more organized at home and work.  Other people noticing improvements in your behavior.  Achieving goals that you set for yourself.  Thinking more clearly.  The following signs may mean that your treatment is not working very well:  Feeling impatience or more confusion.  Missing, forgetting, or being late for appointments.  An increasing sense of disorganization and messiness.  More difficulty in reaching goals that you set for yourself.  Loved ones becoming angry or frustrated with you.  Follow these instructions at home:  Medicines  Take over-the-counter and prescription medicines only as told by your health care provider.  Check with your health care provider before taking any new medicines.  General instructions  Create structure and an organized atmosphere at home. For example:  Make a list of tasks, then rank them from most important to least important. Work on one task at a time until your listed tasks are done.  Make a daily schedule and follow it consistently every day.  Use an appointment calendar, and check it 2-3 times a day to keep on track. Keep it with you when you leave the house.  Create  spaces where you keep certain things, and always put things back in their places after you use them.  Keep all follow-up visits. Your health care provider will need to monitor your condition and adjust your treatment over time.  Where to find support  Talking to others    Keep emotion out of important discussions and speak in a calm, logical way.  Listen closely and patiently to your loved ones. Try to understand their point of view, and try to avoid getting defensive.  Take responsibility for the consequences of your actions.  Ask that others do not take your behaviors personally.  Aim to solve problems as they come up, and express your feelings instead of bottling them up.  Talk openly about what you need from your loved ones and how they can support you.  Consider going to family therapy sessions or having your family meet with a specialist who deals with ADHD-related behavior problems.  Finances  Not all insurance plans cover mental health care, so it is important to check with your insurance carrier. If paying for co-pays or counseling services is a problem, search for a local or [facility] mental health care center. Public mental health care services may be offered there at a low cost or no cost when you are not able to see a private health care provider.  If you are taking medicine for ADHD, you may be able to get the generic form, which may be less expensive than brand-name medicine. Some makers of prescription medicines also offer help to patients who cannot afford the medicines that they need.  Therapy and support groups  Talking with a mental health care provider and participating in support groups can help to improve your quality of life, daily functioning, and overall symptoms.  Questions to ask your health care provider:  What are the risks and benefits of taking medicines?  Would I benefit from therapy?  How often should I follow up with a health care provider?  Where to find more information  Learn more  about ADHD from:  Children and Adults with Attention Deficit Hyperactivity Disorder: benita.org  National [facility] of Mental Health: nim.nih.gov  Centers for Disease Control and Prevention: cdc.gov  Contact a health care provider if:  You have side effects from your medicines, such as:  Repeated muscle twitches, coughing, or speech outbursts.  Sleep problems.  Loss of appetite.  Dizziness.  Unusually fast heartbeat.  Stomach pains.  Headaches.  You have new or worsening behavior problems.  You are struggling with anxiety, depression, or substance abuse.  Get help right away if:  You have a severe reaction to a medicine.  These symptoms may be an emergency. Get help right away. Call 911.  Do not wait to see if the symptoms will go away.  Do not drive yourself to the hospital.  Take one of these steps if you feel like you may hurt yourself or others, or have thoughts about taking your own life:  Go to your nearest emergency room.  Call 911.  Call the National Suicide Prevention Lifeline at 1-629.671.8043 or 543. This is open 24 hours a day.  Text the Crisis Text Line at 013165.  Summary  With treatment and support, you can live with ADHD and manage your symptoms.  Consider taking part in family therapy or self-help groups with family members or friends.  When you talk with friends and family about your ADHD, be patient and communicate openly.  Keep all follow-up visits. Your health care provider will need to monitor your condition and adjust your treatment over time.  This information is not intended to replace advice given to you by your health care provider. Make sure you discuss any questions you have with your health care provider.  Document Revised: 04/07/2023 Document Reviewed: 04/07/2023  Elsevier Patient Education © 2024 [employer]

## 2025-01-23 NOTE — ASSESSMENT & PLAN NOTE
Pelvic floor PT may be very helpful, order given to schedule.  Increased Linzess to 145 mcg daily  Discussed previous testing via blood negative for inflammatory bowel disease, normal TSH   May still need colonoscopy

## 2025-01-23 NOTE — PROGRESS NOTES
"Chief Complaint  ADHD    Subjective        Latanya Cecilia Morgan presents to [facility]  History of Present Illness  Ongoing constipation   Has been doubling up on Linzess  Benefits of Vyvanse outweigh side effect   Tried to go without Vyvanse for 2-3 days and struggled with focus/organization  Lower dose Vyvanse didn't help with constipation  Struggles still with focus some and would like to try higher dose   Previously tried Adderall immediate release and didn't seem to help  Hasn't tried Adderall xr (we didn't send previously due to difficulty finding, drug shortage)      Objective   Vital Signs:  /68   Pulse 60   Temp 98.4 °F (36.9 °C)   Ht 157.5 cm (62.01\")   Wt 48.8 kg (107 lb 9.6 oz)   SpO2 100%   BMI 19.67 kg/m²   Estimated body mass index is 19.67 kg/m² as calculated from the following:    Height as of this encounter: 157.5 cm (62.01\").    Weight as of this encounter: 48.8 kg (107 lb 9.6 oz).    BMI is within normal parameters. No other follow-up for BMI required.      Physical Exam  Vitals and nursing note reviewed.   Constitutional:       General: She is not in acute distress.     Appearance: Normal appearance. She is well-developed and well-groomed. She is not ill-appearing, toxic-appearing or diaphoretic.   HENT:      Head: Normocephalic and atraumatic.      Right Ear: Hearing normal.      Left Ear: Hearing normal.   Eyes:      General: Lids are normal. No scleral icterus.     Extraocular Movements: Extraocular movements intact.   Neck:      Trachea: Phonation normal.   Pulmonary:      Effort: Pulmonary effort is normal.   Musculoskeletal:      Cervical back: Neck supple.   Skin:     Coloration: Skin is not jaundiced or pale.   Neurological:      General: No focal deficit present.      Mental Status: She is alert and oriented to person, place, and time.      Motor: Motor function is intact.   Psychiatric:         Attention and Perception: Attention and " perception normal.         Mood and Affect: Mood and affect normal.         Speech: Speech normal.         Behavior: Behavior normal. Behavior is cooperative.         Thought Content: Thought content normal.         Cognition and Memory: Cognition and memory normal.         Judgment: Judgment normal.        Result Review :  The following data was reviewed by: Juju Mcneil MD on 01/23/2025:  Progress Notes by Donovan Reyna MD (01/21/2025 09:50)   Telephone with Donovan Reyna MD (01/22/2025)     ToxAssure Flex 23, Ur - Urine, Clean Catch (03/12/2024 08:39)            Assessment and Plan   Diagnoses and all orders for this visit:    1. Attention deficit hyperactivity disorder (ADHD), unspecified ADHD type (Primary)  Assessment & Plan:  Vyvanse may be worsening constipation  Patient would like to try Adderall xr to see if it helps ADHD but possibly less constipation issues, 7 day rx sent  If does not want to stay on Adderall xr I can change back to Vyvanse, kept on med list but at 60 mg which would be dose I'd send next time.  UDS due next visit.    Orders:  -     lisdexamfetamine (Vyvanse) 60 MG capsule; Take 1 capsule by mouth Every Morning  -     amphetamine-dextroamphetamine XR (Adderall XR) 25 MG 24 hr capsule; Take 1 capsule by mouth Every Morning  Dispense: 7 capsule; Refill: 0    2. Binge eating disorder, unspecified severity  -     lisdexamfetamine (Vyvanse) 60 MG capsule; Take 1 capsule by mouth Every Morning  -     amphetamine-dextroamphetamine XR (Adderall XR) 25 MG 24 hr capsule; Take 1 capsule by mouth Every Morning  Dispense: 7 capsule; Refill: 0    3. Chronic constipation  Assessment & Plan:  Pelvic floor PT may be very helpful, order given to schedule.  Increased Linzess to 145 mcg daily  Discussed previous testing via blood negative for inflammatory bowel disease, normal TSH   May still need colonoscopy     Orders:  -     linaclotide (Linzess) 145 MCG capsule capsule; Take 1 capsule  by mouth Every Morning Before Breakfast.  Dispense: 90 capsule; Refill: 3  -     Ambulatory Referral to Physical Therapy for Evaluation & Treatment    4. Pelvic floor dysfunction  Comments:  patient to schedule PT, order provided  Orders:  -     Ambulatory Referral to Physical Therapy for Evaluation & Treatment             Follow Up   Return in about 6 weeks (around 3/6/2025) for Controlled Rx Follow Up.  Patient was given instructions and counseling regarding her condition or for health maintenance advice. Please see specific information pulled into the AVS if appropriate.

## 2025-01-31 ENCOUNTER — PATIENT MESSAGE (OUTPATIENT)
Dept: INTERNAL MEDICINE | Facility: CLINIC | Age: 30
End: 2025-01-31
Payer: COMMERCIAL

## 2025-01-31 DIAGNOSIS — F50.819 BINGE EATING DISORDER, UNSPECIFIED SEVERITY: ICD-10-CM

## 2025-01-31 DIAGNOSIS — F90.9 ATTENTION DEFICIT HYPERACTIVITY DISORDER (ADHD), UNSPECIFIED ADHD TYPE: ICD-10-CM

## 2025-01-31 RX ORDER — LISDEXAMFETAMINE DIMESYLATE 60 MG/1
60 CAPSULE ORAL EVERY MORNING
Qty: 30 CAPSULE | Refills: 0 | Status: SHIPPED | OUTPATIENT
Start: 2025-01-31

## 2025-02-13 DIAGNOSIS — R63.4 ABNORMAL WEIGHT LOSS: Primary | ICD-10-CM

## 2025-02-20 ENCOUNTER — TELEMEDICINE (OUTPATIENT)
Dept: FAMILY MEDICINE CLINIC | Facility: TELEHEALTH | Age: 30
End: 2025-02-20
Payer: COMMERCIAL

## 2025-02-20 DIAGNOSIS — B86 SCABIES: Primary | ICD-10-CM

## 2025-02-20 RX ORDER — PREDNISONE 20 MG/1
20 TABLET ORAL 2 TIMES DAILY
Qty: 10 TABLET | Refills: 0 | Status: SHIPPED | OUTPATIENT
Start: 2025-02-20 | End: 2025-02-25

## 2025-02-20 RX ORDER — PERMETHRIN 50 MG/G
1 CREAM TOPICAL ONCE
Qty: 1 G | Refills: 0 | Status: SHIPPED | OUTPATIENT
Start: 2025-02-20 | End: 2025-02-20

## 2025-02-21 NOTE — PROGRESS NOTES
"You have chosen to receive care through a telehealth visit.  Do you consent to use a video/audio connection for your medical care today? Yes     CHIEF COMPLAINT  Chief Complaint   Patient presents with    Rash         HPI  Latanya Morgan is a 29 y.o. female  presents with complaint of rash all over body x 1 week.  Rash along the belt and bra line and the itching is worse at night.    Review of Systems   Skin:  Positive for rash.   All other systems reviewed and are negative.      Past Medical History:   Diagnosis Date    Abnormal Pap smear of cervix     Anxiety     Arthritis     Body piercing     ears and nose    Depression     Dysphagia     Patient reported \"sometimes feels like things won't go down\"    GERD (gastroesophageal reflux disease)     History of fracture     Coccyx and left arm    History of Holter monitoring 2018    states was WNL    Migraine     Seasonal allergies     Tattoos     Wears glasses        Family History   Problem Relation Age of Onset    Migraines Mother     Anxiety disorder Mother     Hypertension Maternal Grandmother     Anxiety disorder Maternal Grandmother     Arthritis Maternal Grandfather        Social History     Socioeconomic History    Marital status:     Highest education level: Some college, no degree   Tobacco Use    Smoking status: Former     Current packs/day: 0.00     Average packs/day: 0.3 packs/day for 4.0 years (1.0 ttl pk-yrs)     Types: Cigarettes, Electronic Cigarette     Start date:      Quit date:      Years since quittin.1    Smokeless tobacco: Never   Vaping Use    Vaping status: Former   Substance and Sexual Activity    Alcohol use: Not Currently     Alcohol/week: 1.0 standard drink of alcohol     Types: 1 Glasses of wine per week     Comment: \"couple of beers once a week\" - reported no etoh abuse    Drug use: No    Sexual activity: Yes     Partners: Male     Birth control/protection: I.U.D.       Latanya Morgan  reports that " she quit smoking about 4 years ago. Her smoking use included cigarettes and electronic cigarette. She started smoking about 7 years ago. She has a 1 pack-year smoking history. She has never used smokeless tobacco.       There were no vitals taken for this visit.    PHYSICAL EXAM  Physical Exam   Constitutional: She appears well-developed and well-nourished.   HENT:   Head: Normocephalic.   Eyes: Pupils are equal, round, and reactive to light.   Pulmonary/Chest: Effort normal.   Musculoskeletal: Normal range of motion.   Neurological: She is alert. Cranial nerve deficit: all over body.   Skin: Rash noted. There is erythema.   Psychiatric: She has a normal mood and affect.       Results for orders placed or performed during the hospital encounter of 11/29/24   Covid-19 + Flu A&B AG, Veritor    Collection Time: 11/29/24 12:04 PM    Specimen: Swab   Result Value Ref Range    SARS Antigen Not Detected Not Detected, Presumptive Negative    Influenza A Antigen GEORGE Not Detected Not Detected    Influenza B Antigen GEORGE Not Detected Not Detected    Internal Control Passed Passed    Lot Number [id number]     Expiration Date 9/25    POC Rapid Strep A    Collection Time: 11/29/24 12:04 PM    Specimen: Swab   Result Value Ref Range    Rapid Strep A Screen Negative     Internal Control Passed     Lot Number [id number]     Expiration Date 1/27        Diagnoses and all orders for this visit:    1. Scabies (Primary)  -     permethrin (ELIMITE) 5 % cream; Apply 1 Application topically to the appropriate area as directed 1 (One) Time for 1 dose.  Dispense: 1 g; Refill: 0  -     predniSONE (DELTASONE) 20 MG tablet; Take 1 tablet by mouth 2 (Two) Times a Day for 5 days.  Dispense: 10 tablet; Refill: 0          FOLLOW-UP  As discussed during visit with PCP/[facility] if no improvement or Urgent Care/Emergency Department if worsening of symptoms    Patient verbalizes understanding of medication dosage, comfort measures, instructions for  treatment and follow-up.    Mary Kay Brown, APRN  02/20/2025  19:37 EST    Mode of Visit: Video  Location of patient: -HOME-  Location of provider: +HOME+  You have chosen to receive care through a telehealth visit.  The patient has signed the video visit consent form.  The visit included audio and video interaction. No technical issues occurred during this visit.      The use of a video visit has been reviewed with the patient and verbal informed consent has been obtained. Myself and Latanya Morgan participated in this visit. The patient is located in [address] [place] [address].   I am located in [place], [place]. [employer] and [employer] Client were utilized. I spent 10 minutes in the patient's chart for this visit.         Note Disclaimer: At [facility], we believe that sharing information builds trust and better   relationships. You are receiving this note because you recently visited [facility]. It is possible you   will see health information before a provider has talked with you about it. This kind of information can   be easy to misunderstand. To help you fully understand what it means for your health, we urge you to   discuss this note with your provider.

## 2025-02-28 ENCOUNTER — PATIENT MESSAGE (OUTPATIENT)
Dept: INTERNAL MEDICINE | Facility: CLINIC | Age: 30
End: 2025-02-28
Payer: COMMERCIAL

## 2025-02-28 DIAGNOSIS — F50.819 BINGE EATING DISORDER, UNSPECIFIED SEVERITY: ICD-10-CM

## 2025-02-28 DIAGNOSIS — F90.9 ATTENTION DEFICIT HYPERACTIVITY DISORDER (ADHD), UNSPECIFIED ADHD TYPE: ICD-10-CM

## 2025-02-28 RX ORDER — LISDEXAMFETAMINE DIMESYLATE 60 MG/1
60 CAPSULE ORAL EVERY MORNING
Qty: 30 CAPSULE | Refills: 0 | Status: SHIPPED | OUTPATIENT
Start: 2025-02-28

## 2025-03-06 ENCOUNTER — OFFICE VISIT (OUTPATIENT)
Dept: INTERNAL MEDICINE | Facility: CLINIC | Age: 30
End: 2025-03-06
Payer: COMMERCIAL

## 2025-03-06 VITALS
HEART RATE: 70 BPM | OXYGEN SATURATION: 100 % | HEIGHT: 62 IN | BODY MASS INDEX: 19.14 KG/M2 | DIASTOLIC BLOOD PRESSURE: 76 MMHG | WEIGHT: 104 LBS | TEMPERATURE: 97.9 F | SYSTOLIC BLOOD PRESSURE: 118 MMHG

## 2025-03-06 DIAGNOSIS — F90.9 ATTENTION DEFICIT HYPERACTIVITY DISORDER (ADHD), UNSPECIFIED ADHD TYPE: Primary | ICD-10-CM

## 2025-03-06 DIAGNOSIS — Z51.81 MEDICATION MONITORING ENCOUNTER: ICD-10-CM

## 2025-03-06 DIAGNOSIS — R79.9 ABNORMAL BLOOD CHEMISTRY: ICD-10-CM

## 2025-03-06 DIAGNOSIS — F41.9 ANXIETY: ICD-10-CM

## 2025-03-06 DIAGNOSIS — L29.9 PRURITUS: ICD-10-CM

## 2025-03-06 DIAGNOSIS — R81 GLUCOSURIA: ICD-10-CM

## 2025-03-06 DIAGNOSIS — R82.998 DARK URINE: ICD-10-CM

## 2025-03-06 DIAGNOSIS — F50.819 BINGE EATING DISORDER, UNSPECIFIED SEVERITY: ICD-10-CM

## 2025-03-06 LAB
BILIRUB BLD-MCNC: NEGATIVE MG/DL
CLARITY, POC: CLEAR
COLOR UR: YELLOW
EXPIRATION DATE: ABNORMAL
EXPIRATION DATE: NORMAL
GLUCOSE UR STRIP-MCNC: ABNORMAL MG/DL
HBA1C MFR BLD: 4.8 % (ref 4.5–5.7)
KETONES UR QL: NEGATIVE
LEUKOCYTE EST, POC: NEGATIVE
Lab: ABNORMAL
Lab: NORMAL
NITRITE UR-MCNC: NEGATIVE MG/ML
PH UR: 5.5 [PH] (ref 5–8)
PROT UR STRIP-MCNC: NEGATIVE MG/DL
RBC # UR STRIP: NEGATIVE /UL
SP GR UR: 1.03 (ref 1–1.03)
UROBILINOGEN UR QL: ABNORMAL

## 2025-03-06 NOTE — PROGRESS NOTES
"Chief Complaint  ADHD    Subjective        Latanya Morgan presents to [facility]  History of Present Illness  Vyvanse helping with focus  Doesn't cut appetite like it used to  Wants to gain weight; does not want to lower Vyvanse.  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  Urine today--questioned possible infection due to appearance  No history of diabetes, passed glucola during pregnancy  Has symptoms of hypoglycemia often, feels low on sugar at approx 80  Anxiety not well controlled  Itchy  Worries iron could be low?        Objective   Vital Signs:  /76   Pulse 70   Temp 97.9 °F (36.6 °C)   Ht 157.5 cm (62.01\")   Wt 47.2 kg (104 lb)   SpO2 100%   BMI 19.02 kg/m²   Estimated body mass index is 19.02 kg/m² as calculated from the following:    Height as of this encounter: 157.5 cm (62.01\").    Weight as of this encounter: 47.2 kg (104 lb).    BMI is within normal parameters. No other follow-up for BMI required.      Physical Exam  Vitals and nursing note reviewed.   Constitutional:       General: She is not in acute distress.     Appearance: Normal appearance. She is well-developed and well-groomed. She is not ill-appearing, toxic-appearing or diaphoretic.   HENT:      Head: Normocephalic and atraumatic.      Right Ear: Hearing normal.      Left Ear: Hearing normal.   Eyes:      General: Lids are normal. No scleral icterus.     Extraocular Movements: Extraocular movements intact.   Neck:      Trachea: Phonation normal.   Cardiovascular:      Rate and Rhythm: Normal rate and regular rhythm.   Pulmonary:      Effort: Pulmonary effort is normal.   Musculoskeletal:      Cervical back: Neck supple.   Skin:     Coloration: Skin is not jaundiced or pale.   Neurological:      General: No focal deficit present.      Mental Status: She is alert and oriented to person, place, and time.      Motor: Motor function is intact. "   Psychiatric:         Attention and Perception: Attention and perception normal.         Mood and Affect: Affect normal. Mood is anxious.         Speech: Speech normal.         Behavior: Behavior normal. Behavior is cooperative.         Thought Content: Thought content normal.         Cognition and Memory: Cognition and memory normal.         Judgment: Judgment normal.        Result Review :  The following data was reviewed by: Juju Mcneil MD on 03/06/2025:  ToxAssure Flex 23, Ur - Urine, Clean Catch (03/12/2024 08:39)     Office Visit on 03/06/2025   Component Date Value Ref Range Status    Color 03/06/2025 Yellow  Yellow, Straw, Dark Yellow, Reena Final    Clarity, UA 03/06/2025 Clear  Clear Final    Specific Gravity  03/06/2025 1.030  1.005 - 1.030 Final    pH, Urine 03/06/2025 5.5  5.0 - 8.0 Final    Leukocytes 03/06/2025 Negative  Negative Final    Nitrite, UA 03/06/2025 Negative  Negative Final    Protein, POC 03/06/2025 Negative  Negative mg/dL Final    Glucose, UA 03/06/2025 2+ (A)  Negative mg/dL Final    Ketones, UA 03/06/2025 Negative  Negative Final    Urobilinogen, UA 03/06/2025 0.2 E.U./dL  Normal, 0.2 E.U./dL Final    Bilirubin 03/06/2025 Negative  Negative Final    Blood, UA 03/06/2025 Negative  Negative Final    Lot Number 03/06/2025 [id number]   Final    Expiration Date 03/06/2025 06/02/2026   Final    Hemoglobin A1C 03/06/2025 4.8  4.5 - 5.7 % Final    Lot Number 03/06/2025 [id number]   Final    Expiration Date 03/06/2025 10/18/2026   Final               Assessment and Plan   Diagnoses and all orders for this visit:    1. Attention deficit hyperactivity disorder (ADHD), unspecified ADHD type (Primary)  Assessment & Plan:  Continue Vyvanse, no dose changes at this time.  Refill not due until closer to 3/28/25    Orders:  -     COMPLIANCE DRUG ANALYSIS, NO THC -  -     TSH Rfx On Abnormal To Free T4  -     Ambulatory Referral to Psychiatry    2. Binge eating disorder, unspecified  severity  -     COMPLIANCE DRUG ANALYSIS, NO THC -  -     TSH Rfx On Abnormal To Free T4    3. Anxiety  -     Ambulatory Referral to Psychiatry    4. Medication monitoring encounter  -     COMPLIANCE DRUG ANALYSIS, NO THC -  -     TSH Rfx On Abnormal To Free T4  -     Iron Profile  -     Ferritin  -     Vitamin B12 & Folate  -     CBC & Differential  -     Comprehensive Metabolic Panel  -     Celiac Disease Panel    5. Glucosuria  -     POC Glycosylated Hemoglobin (Hb A1C)  -     TSH Rfx On Abnormal To Free T4  -     Comprehensive Metabolic Panel    6. Dark urine  -     POC Urinalysis Dipstick, Automated  -     Comprehensive Metabolic Panel    7. Pruritus  -     TSH Rfx On Abnormal To Free T4  -     Iron Profile  -     Ferritin  -     CBC & Differential  -     Comprehensive Metabolic Panel  -     Celiac Disease Panel    8. Abnormal blood chemistry  -     TSH Rfx On Abnormal To Free T4  -     Iron Profile  -     Ferritin  -     Vitamin B12 & Folate  -     CBC & Differential  -     Comprehensive Metabolic Panel  -     Celiac Disease Panel             Follow Up   Return in about 3 months (around 6/9/2025) for Annual physical.  Patient was given instructions and counseling regarding her condition or for health maintenance advice. Please see specific information pulled into the AVS if appropriate.

## 2025-03-07 LAB
ALBUMIN SERPL-MCNC: 4.7 G/DL (ref 3.5–5.2)
ALBUMIN/GLOB SERPL: 1.7 G/DL
ALP SERPL-CCNC: 74 U/L (ref 39–117)
ALT SERPL-CCNC: 12 U/L (ref 1–33)
AST SERPL-CCNC: 19 U/L (ref 1–32)
BASOPHILS # BLD AUTO: 0.02 10*3/MM3 (ref 0–0.2)
BASOPHILS NFR BLD AUTO: 0.2 % (ref 0–1.5)
BILIRUB SERPL-MCNC: 0.6 MG/DL (ref 0–1.2)
BUN SERPL-MCNC: 9 MG/DL (ref 6–20)
BUN/CREAT SERPL: 12.7 (ref 7–25)
CALCIUM SERPL-MCNC: 9.8 MG/DL (ref 8.6–10.5)
CHLORIDE SERPL-SCNC: 100 MMOL/L (ref 98–107)
CO2 SERPL-SCNC: 25.4 MMOL/L (ref 22–29)
CREAT SERPL-MCNC: 0.71 MG/DL (ref 0.57–1)
EGFRCR SERPLBLD CKD-EPI 2021: 119.7 ML/MIN/1.73
ENDOMYSIUM IGA SER QL: NEGATIVE
EOSINOPHIL # BLD AUTO: 0 10*3/MM3 (ref 0–0.4)
EOSINOPHIL NFR BLD AUTO: 0 % (ref 0.3–6.2)
ERYTHROCYTE [DISTWIDTH] IN BLOOD BY AUTOMATED COUNT: 12.3 % (ref 12.3–15.4)
FERRITIN SERPL-MCNC: 43.4 NG/ML (ref 13–150)
FOLATE SERPL-MCNC: 6.39 NG/ML (ref 4.78–24.2)
GLOBULIN SER CALC-MCNC: 2.7 GM/DL
GLUCOSE SERPL-MCNC: 88 MG/DL (ref 65–99)
HCT VFR BLD AUTO: 42.1 % (ref 34–46.6)
HGB BLD-MCNC: 14.7 G/DL (ref 12–15.9)
IGA SERPL-MCNC: 87 MG/DL (ref 87–352)
IMM GRANULOCYTES # BLD AUTO: 0.03 10*3/MM3 (ref 0–0.05)
IMM GRANULOCYTES NFR BLD AUTO: 0.3 % (ref 0–0.5)
IRON SATN MFR SERPL: 37 % (ref 20–50)
IRON SERPL-MCNC: 152 MCG/DL (ref 37–145)
LYMPHOCYTES # BLD AUTO: 1.21 10*3/MM3 (ref 0.7–3.1)
LYMPHOCYTES NFR BLD AUTO: 13.7 % (ref 19.6–45.3)
MCH RBC QN AUTO: 30 PG (ref 26.6–33)
MCHC RBC AUTO-ENTMCNC: 34.9 G/DL (ref 31.5–35.7)
MCV RBC AUTO: 85.9 FL (ref 79–97)
MONOCYTES # BLD AUTO: 0.27 10*3/MM3 (ref 0.1–0.9)
MONOCYTES NFR BLD AUTO: 3.1 % (ref 5–12)
NEUTROPHILS # BLD AUTO: 7.3 10*3/MM3 (ref 1.7–7)
NEUTROPHILS NFR BLD AUTO: 82.7 % (ref 42.7–76)
NRBC BLD AUTO-RTO: 0 /100 WBC (ref 0–0.2)
PLATELET # BLD AUTO: 413 10*3/MM3 (ref 140–450)
POTASSIUM SERPL-SCNC: 4.1 MMOL/L (ref 3.5–5.2)
PROT SERPL-MCNC: 7.4 G/DL (ref 6–8.5)
RBC # BLD AUTO: 4.9 10*6/MM3 (ref 3.77–5.28)
SODIUM SERPL-SCNC: 137 MMOL/L (ref 136–145)
TIBC SERPL-MCNC: 408 MCG/DL
TSH SERPL DL<=0.005 MIU/L-ACNC: 2.39 UIU/ML (ref 0.27–4.2)
TTG IGA SER-ACNC: <2 U/ML (ref 0–3)
UIBC SERPL-MCNC: 256 MCG/DL (ref 112–346)
VIT B12 SERPL-MCNC: 569 PG/ML (ref 211–946)
WBC # BLD AUTO: 8.83 10*3/MM3 (ref 3.4–10.8)

## 2025-03-14 LAB — DRUGS UR: NORMAL

## 2025-03-23 ENCOUNTER — TELEMEDICINE (OUTPATIENT)
Dept: FAMILY MEDICINE CLINIC | Facility: TELEHEALTH | Age: 30
End: 2025-03-23
Payer: COMMERCIAL

## 2025-03-23 DIAGNOSIS — J01.40 ACUTE PANSINUSITIS, RECURRENCE NOT SPECIFIED: Primary | ICD-10-CM

## 2025-03-23 RX ORDER — PREDNISONE 10 MG/1
TABLET ORAL
Qty: 21 TABLET | Refills: 0 | Status: SHIPPED | OUTPATIENT
Start: 2025-03-23 | End: 2025-03-30

## 2025-03-23 NOTE — PROGRESS NOTES
"You have chosen to receive care through a telehealth visit.  Do you consent to use a video/audio connection for your medical care today? Yes     Patient or patient representative verbalized consent for the use of Ambient Listening during the visit with  NINFA Keita for chart documentation. 3/23/2025  12:05 EDT    CHIEF COMPLAINT  No chief complaint on file.        HPI  History of Present Illness  The patient is a 29-year-old female presenting with complaints of cough and congestion.    She has been experiencing symptoms suggestive of allergies for the past 3 weeks, which she attributes to the change in weather. Two days ago, she began to experience significant pressure in her sinuses, accompanied by a headache. She also reported neck pain upon awakening this morning. Her mother, who is a nurse, assessed her lung function and found it to be normal. She does not report any nighttime coughing and does not require any sleep aids. She took Zyrtec yesterday, which resulted in grogginess and led her to suspect a possible influenza infection. However, upon waking this morning, she no longer felt as though she had the flu. She has been producing yellow sputum through coughing.    ALLERGIES  The patient has no known allergies.    MEDICATIONS  Current: Zyrtec       Review of Systems  See HPI    Past Medical History:   Diagnosis Date    Abnormal Pap smear of cervix     Anxiety     Arthritis     Body piercing     ears and nose    Depression     Dysphagia     Patient reported \"sometimes feels like things won't go down\"    GERD (gastroesophageal reflux disease)     History of fracture     Coccyx and left arm    History of Holter monitoring 2018    states was WNL    Migraine     Seasonal allergies     Tattoos     Wears glasses        Family History   Problem Relation Age of Onset    Migraines Mother     Anxiety disorder Mother     Hypertension Maternal Grandmother     Anxiety disorder Maternal Grandmother     Arthritis " "Maternal Grandfather        Social History     Socioeconomic History    Marital status:     Highest education level: Some college, no degree   Tobacco Use    Smoking status: Former     Current packs/day: 0.00     Average packs/day: 0.3 packs/day for 4.0 years (1.0 ttl pk-yrs)     Types: Cigarettes, Electronic Cigarette     Start date:      Quit date:      Years since quittin.2    Smokeless tobacco: Never   Vaping Use    Vaping status: Former   Substance and Sexual Activity    Alcohol use: Not Currently     Alcohol/week: 1.0 standard drink of alcohol     Types: 1 Glasses of wine per week     Comment: \"couple of beers once a week\" - reported no etoh abuse    Drug use: No    Sexual activity: Yes     Partners: Male     Birth control/protection: I.U.D.       Latanya Morgan  reports that she quit smoking about 4 years ago. Her smoking use included cigarettes and electronic cigarette. She started smoking about 7 years ago. She has a 1 pack-year smoking history. She has never used smokeless tobacco.             There were no vitals taken for this visit.    PHYSICAL EXAM  Physical Exam   Constitutional: She is oriented to person, place, and time. She appears well-developed and well-nourished. She does not have a sickly appearance. She does not appear ill.   HENT:   Head: Normocephalic and atraumatic.   Nose: Right sinus exhibits maxillary sinus tenderness and frontal sinus tenderness. Left sinus exhibits maxillary sinus tenderness and frontal sinus tenderness.   Pulmonary/Chest: Effort normal.  No respiratory distress.  Neurological: She is alert and oriented to person, place, and time.           Diagnoses and all orders for this visit:    1. Acute pansinusitis, recurrence not specified (Primary)  -     amoxicillin-clavulanate (AUGMENTIN) 875-125 MG per tablet; Take 1 tablet by mouth 2 (Two) Times a Day for 10 days.  Dispense: 20 tablet; Refill: 0  -     predniSONE (DELTASONE) 10 MG (21) dose " pack; Use as directed on package  Dispense: 21 tablet; Refill: 0    --take medications as prescribed  --increase fluids, rest as needed, tylenol or ibuprofen for pain  --f/u in 5-7 days if no improvement      Assessment & Plan  1. Sinus infection.  She has been experiencing symptoms suggestive of allergies for the past 3 weeks, which she attributes to the change in weather. Two days ago, she began to experience significant pressure in her sinuses, accompanied by a headache. She also reported neck pain upon awakening this morning. Her mother, who is a nurse, assessed her lung function and found it to be normal. She does not report any nighttime coughing and does not require any sleep aids. She took Zyrtec yesterday, which resulted in grogginess and led her to suspect a possible influenza infection. However, upon waking this morning, she no longer felt as though she had the flu. She has been producing yellow sputum through coughing. A prescription for Augmentin 875 mg, to be taken twice daily for 10 days, has been issued. Additionally, a course of prednisone has been prescribed, to be taken as per the instructions on the package. The steroid pack should help dry up the drainage and alleviate the cough. The prescriptions will be sent to the [facility] in [place].         FOLLOW-UP  As discussed during visit with PCP/[facility] if no improvement or Urgent Care/Emergency Department if worsening of symptoms    Patient verbalizes understanding of medication dosage, comfort measures, instructions for treatment and follow-up.    Citlalli Penaloza, APRN  03/23/2025  12:05 EDT    Mode of Visit: Video  Location of patient: -WORK-  Location of provider: +HOME+  You have chosen to receive care through a telehealth visit.  The patient has signed the video visit consent form.  The visit included audio and video interaction. No technical issues occurred during this visit.    The use of a video visit has been reviewed  with the patient and verbal informed consent has been obtained. Myself and Latanya Morgan     participated in this visit. The patient is located in [address] [place], [place] [id number]  I am located in [place], [place]. [employer] and [employer] Video Client were utilized. I spent 3 minutes in the patient's chart for this visit.      Note Disclaimer: At [facility], we believe that sharing information builds trust and better   relationships. You are receiving this note because you recently visited [facility]. It is possible you   will see health information before a provider has talked with you about it. This kind of information can   be easy to misunderstand. To help you fully understand what it means for your health, we urge you to   discuss this note with your provider.

## 2025-04-02 ENCOUNTER — PATIENT MESSAGE (OUTPATIENT)
Dept: INTERNAL MEDICINE | Facility: CLINIC | Age: 30
End: 2025-04-02
Payer: COMMERCIAL

## 2025-04-02 DIAGNOSIS — F90.9 ATTENTION DEFICIT HYPERACTIVITY DISORDER (ADHD), UNSPECIFIED ADHD TYPE: ICD-10-CM

## 2025-04-02 DIAGNOSIS — F50.819 BINGE EATING DISORDER, UNSPECIFIED SEVERITY: ICD-10-CM

## 2025-04-02 RX ORDER — LISDEXAMFETAMINE DIMESYLATE 60 MG/1
60 CAPSULE ORAL EVERY MORNING
Qty: 30 CAPSULE | Refills: 0 | Status: SHIPPED | OUTPATIENT
Start: 2025-04-02

## 2025-04-30 ENCOUNTER — PATIENT MESSAGE (OUTPATIENT)
Dept: INTERNAL MEDICINE | Facility: CLINIC | Age: 30
End: 2025-04-30
Payer: COMMERCIAL

## 2025-04-30 DIAGNOSIS — F50.819 BINGE EATING DISORDER, UNSPECIFIED SEVERITY: ICD-10-CM

## 2025-04-30 DIAGNOSIS — F90.9 ATTENTION DEFICIT HYPERACTIVITY DISORDER (ADHD), UNSPECIFIED ADHD TYPE: ICD-10-CM

## 2025-04-30 RX ORDER — LISDEXAMFETAMINE DIMESYLATE 60 MG/1
60 CAPSULE ORAL EVERY MORNING
Qty: 30 CAPSULE | Refills: 0 | Status: SHIPPED | OUTPATIENT
Start: 2025-04-30

## 2025-04-30 NOTE — TELEPHONE ENCOUNTER
Rx Refill Note  Requested Prescriptions     Pending Prescriptions Disp Refills    lisdexamfetamine (Vyvanse) 60 MG capsule 30 capsule 0     Sig: Take 1 capsule by mouth Every Morning      Last office visit with prescribing clinician: 3/6/2025   Last telemedicine visit with prescribing clinician: Visit date not found   Next office visit with prescribing clinician: Visit date not found   {  Last UDS-3/6/25.

## 2025-05-07 ENCOUNTER — OFFICE VISIT (OUTPATIENT)
Dept: INTERNAL MEDICINE | Facility: CLINIC | Age: 30
End: 2025-05-07
Payer: COMMERCIAL

## 2025-05-07 VITALS
TEMPERATURE: 98.4 F | WEIGHT: 102.12 LBS | SYSTOLIC BLOOD PRESSURE: 114 MMHG | BODY MASS INDEX: 18.79 KG/M2 | HEIGHT: 62 IN | HEART RATE: 98 BPM | OXYGEN SATURATION: 100 % | DIASTOLIC BLOOD PRESSURE: 68 MMHG

## 2025-05-07 DIAGNOSIS — R59.1 LYMPHADENOPATHY OF HEAD AND NECK: ICD-10-CM

## 2025-05-07 DIAGNOSIS — N63.22 MASS OF UPPER INNER QUADRANT OF LEFT BREAST: Primary | ICD-10-CM

## 2025-05-07 DIAGNOSIS — D24.2 FIBROADENOMA OF LEFT BREAST: ICD-10-CM

## 2025-05-07 PROCEDURE — 99213 OFFICE O/P EST LOW 20 MIN: CPT | Performed by: FAMILY MEDICINE

## 2025-05-07 NOTE — PROGRESS NOTES
"Chief Complaint  Breast Problem (Lump in left breast.)    Subjective        Latanya Morgan presents to [facility]  History of Present Illness  Dr. Huerta with breast surgery wanted her to have MRI but could not afford.  Still has that spot and now feels another spot  Feels a lump upper left 10-11  Lower spot approx 7:00, wire in bra hurts it, but feels larger    Lymph nodes easily palpable posterior left, neck, similar to previous she states      Objective   Vital Signs:  /68   Pulse 98   Temp 98.4 °F (36.9 °C)   Ht 157.5 cm (62.01\")   Wt 46.3 kg (102 lb 1.9 oz)   SpO2 100%   BMI 18.67 kg/m²   Estimated body mass index is 18.67 kg/m² as calculated from the following:    Height as of this encounter: 157.5 cm (62.01\").    Weight as of this encounter: 46.3 kg (102 lb 1.9 oz).          Physical Exam  Vitals and nursing note reviewed.   Constitutional:       General: She is not in acute distress.     Appearance: Normal appearance. She is well-developed, well-groomed and normal weight. She is not ill-appearing, toxic-appearing or diaphoretic.   HENT:      Head: Normocephalic and atraumatic.      Right Ear: Hearing normal.      Left Ear: Hearing normal.   Eyes:      General: Lids are normal. No scleral icterus.     Extraocular Movements: Extraocular movements intact.   Neck:      Trachea: Phonation normal.   Pulmonary:      Effort: Pulmonary effort is normal.   Chest:       Musculoskeletal:      Cervical back: Neck supple.   Lymphadenopathy:      Cervical: Cervical adenopathy present.      Left cervical: Posterior cervical adenopathy (shotty feel) present.   Skin:     Coloration: Skin is not jaundiced or pale.   Neurological:      General: No focal deficit present.      Mental Status: She is alert and oriented to person, place, and time.      Motor: Motor function is intact.   Psychiatric:         Attention and Perception: Attention and perception normal.         Mood and " Affect: Mood and affect normal.         Speech: Speech normal.         Behavior: Behavior normal. Behavior is cooperative.         Thought Content: Thought content normal.         Cognition and Memory: Cognition and memory normal.         Judgment: Judgment normal.          Result Review :  The following data was reviewed by: Juju Mcneil MD on 04/16/2025:  US Breast Left Limited (05/31/2023 09:57)   US breast left limited (11/16/2020 13:11)   US breast left limited (08/17/2020 14:16)            Assessment and Plan   Diagnoses and all orders for this visit:    1. Mass of upper inner quadrant of left breast (Primary)  -     Ambulatory Referral to Breast Surgery    2. Fibroadenoma of left breast  -     Ambulatory Referral to Breast Surgery    3. Lymphadenopathy of head and neck       Referral back to Dr. Payal Huerta to discuss breast issues. Will defer imaging as she may need MRI as they discussed previously (per patient).     Recheck lymph nodes next time. She is slender build and lymph nodes not changing so that may be her baseline/normal.            Follow Up   Return for As scheduled previously.  Patient was given instructions and counseling regarding her condition or for health maintenance advice. Please see specific information pulled into the AVS if appropriate.

## 2025-05-15 ENCOUNTER — PATIENT MESSAGE (OUTPATIENT)
Dept: INTERNAL MEDICINE | Facility: CLINIC | Age: 30
End: 2025-05-15
Payer: COMMERCIAL

## 2025-05-15 DIAGNOSIS — D24.2 FIBROADENOMA OF LEFT BREAST: ICD-10-CM

## 2025-05-15 DIAGNOSIS — N63.22 MASS OF UPPER INNER QUADRANT OF LEFT BREAST: Primary | ICD-10-CM

## 2025-05-22 DIAGNOSIS — N63.22 MASS OF UPPER INNER QUADRANT OF LEFT BREAST: Primary | ICD-10-CM

## 2025-05-22 DIAGNOSIS — D24.2 FIBROADENOMA OF LEFT BREAST: ICD-10-CM

## 2025-06-02 ENCOUNTER — PATIENT MESSAGE (OUTPATIENT)
Dept: INTERNAL MEDICINE | Facility: CLINIC | Age: 30
End: 2025-06-02
Payer: COMMERCIAL

## 2025-06-02 DIAGNOSIS — F90.9 ATTENTION DEFICIT HYPERACTIVITY DISORDER (ADHD), UNSPECIFIED ADHD TYPE: ICD-10-CM

## 2025-06-02 DIAGNOSIS — F50.819 BINGE EATING DISORDER, UNSPECIFIED SEVERITY: ICD-10-CM

## 2025-06-02 RX ORDER — LISDEXAMFETAMINE DIMESYLATE 60 MG/1
60 CAPSULE ORAL EVERY MORNING
Qty: 30 CAPSULE | Refills: 0 | Status: SHIPPED | OUTPATIENT
Start: 2025-06-02

## 2025-06-02 RX ORDER — LISDEXAMFETAMINE DIMESYLATE 60 MG/1
60 CAPSULE ORAL EVERY MORNING
Qty: 30 CAPSULE | Refills: 0 | Status: CANCELLED | OUTPATIENT
Start: 2025-06-02

## 2025-06-02 NOTE — TELEPHONE ENCOUNTER
Rx Refill Note  Requested Prescriptions     Pending Prescriptions Disp Refills    lisdexamfetamine (Vyvanse) 60 MG capsule 30 capsule 0     Sig: Take 1 capsule by mouth Every Morning      Last office visit with prescribing clinician: 5/7/2025   Last telemedicine visit with prescribing clinician: Visit date not found   Next office visit with prescribing clinician: 6/24/2025     Last UDS-3-6-25.

## 2025-06-13 ENCOUNTER — HOSPITAL ENCOUNTER (OUTPATIENT)
Facility: HOSPITAL | Age: 30
Discharge: HOME | End: 2025-06-13
Admitting: FAMILY MEDICINE
Payer: COMMERCIAL

## 2025-06-13 ENCOUNTER — TELEPHONE (OUTPATIENT)
Facility: HOSPITAL | Age: 30
End: 2025-06-13
Payer: COMMERCIAL

## 2025-06-13 DIAGNOSIS — D24.2 FIBROADENOMA OF LEFT BREAST: ICD-10-CM

## 2025-06-13 DIAGNOSIS — N63.22 MASS OF UPPER INNER QUADRANT OF LEFT BREAST: ICD-10-CM

## 2025-06-13 PROCEDURE — 76642 ULTRASOUND BREAST LIMITED: CPT

## 2025-06-13 NOTE — TELEPHONE ENCOUNTER
Patient notified of surgical consult appointment with Dr LUZ Siegel on 7.9.25 @ 1045 with arrival time of 1015, at the [facility], [address]. Patient given office contact & location information. Patient notified to bring photo ID, insurance information, list of prescription & OTC medications. Patient may be accompanied by family member or friend for support. Patient verbalized understanding.     Reviewed what would be discussed at surgical consult visit, including review of pathology report and imaging reports; treatment options and pros/cons. Patient encouraged to contact surgeon's office with questions or concerns. Patient verbalized understanding.

## 2025-06-19 ENCOUNTER — TELEPHONE (OUTPATIENT)
Facility: HOSPITAL | Age: 30
End: 2025-06-19
Payer: COMMERCIAL

## 2025-06-19 NOTE — TELEPHONE ENCOUNTER
Returned patient call, notified of re-scheduled surgical consult appointment with Dr LUZ Siegel on 6.26.25 @ 1145 with arrival time of 1115, at the [facility], [address]. Patient given office contact & location information. Patient notified to bring photo ID, insurance information, list of prescription & OTC medications. Patient may be accompanied by family member or friend for support. Patient verbalized understanding.     Reviewed what would be discussed at surgical consult visit, including review of pathology report and imaging reports; treatment options and pros/cons. Patient encouraged to contact surgeon's office with questions or concerns. Patient verbalized understanding.

## 2025-06-23 ENCOUNTER — OFFICE VISIT (OUTPATIENT)
Dept: INTERNAL MEDICINE | Facility: CLINIC | Age: 30
End: 2025-06-23
Payer: COMMERCIAL

## 2025-06-23 VITALS
WEIGHT: 104 LBS | SYSTOLIC BLOOD PRESSURE: 106 MMHG | BODY MASS INDEX: 19.14 KG/M2 | HEART RATE: 104 BPM | HEIGHT: 62 IN | TEMPERATURE: 98.8 F | DIASTOLIC BLOOD PRESSURE: 68 MMHG | OXYGEN SATURATION: 100 %

## 2025-06-23 DIAGNOSIS — F90.9 ATTENTION DEFICIT HYPERACTIVITY DISORDER (ADHD), UNSPECIFIED ADHD TYPE: ICD-10-CM

## 2025-06-23 DIAGNOSIS — J01.40 ACUTE NON-RECURRENT PANSINUSITIS: ICD-10-CM

## 2025-06-23 DIAGNOSIS — F50.819 BINGE EATING DISORDER, UNSPECIFIED SEVERITY: ICD-10-CM

## 2025-06-23 DIAGNOSIS — J02.9 SORE THROAT: Primary | ICD-10-CM

## 2025-06-23 DIAGNOSIS — H66.90 OTITIS MEDIA WITHOUT SPONTANEOUS RUPTURE OF TYMPANIC MEMBRANE: ICD-10-CM

## 2025-06-23 PROCEDURE — 87428 SARSCOV & INF VIR A&B AG IA: CPT | Performed by: FAMILY MEDICINE

## 2025-06-23 PROCEDURE — 99214 OFFICE O/P EST MOD 30 MIN: CPT | Performed by: FAMILY MEDICINE

## 2025-06-23 PROCEDURE — 87880 STREP A ASSAY W/OPTIC: CPT | Performed by: FAMILY MEDICINE

## 2025-06-23 RX ORDER — LISDEXAMFETAMINE DIMESYLATE 60 MG/1
60 CAPSULE ORAL EVERY MORNING
Qty: 30 CAPSULE | Refills: 0 | Status: SHIPPED | OUTPATIENT
Start: 2025-06-30

## 2025-06-23 NOTE — PROGRESS NOTES
"Chief Complaint  Headache (Onset yesterday. ), Facial Pain, Cough (Drainage. ), Sore Throat (When she 1st gets up in the mornings.), and Earache (left)    Subjective        Latanya Morgan presents to [facility]  History of Present Illness  History per ma reviewed  Symptoms 4 days worsening      Objective   Vital Signs:  /68   Pulse 104   Temp 98.8 °F (37.1 °C)   Ht 157.5 cm (62.01\")   Wt 47.2 kg (104 lb)   SpO2 100%   BMI 19.02 kg/m²   Estimated body mass index is 19.02 kg/m² as calculated from the following:    Height as of this encounter: 157.5 cm (62.01\").    Weight as of this encounter: 47.2 kg (104 lb).          Physical Exam  Vitals and nursing note reviewed.   Constitutional:       General: She is not in acute distress.     Appearance: Normal appearance. She is well-developed and well-groomed. She is ill-appearing. She is not toxic-appearing or diaphoretic.      Interventions: Face mask in place.   HENT:      Head: Normocephalic and atraumatic.      Right Ear: Hearing, tympanic membrane, ear canal and external ear normal.      Left Ear: Hearing, ear canal and external ear normal. Tympanic membrane is erythematous.   Eyes:      General: Lids are normal. No scleral icterus.     Extraocular Movements: Extraocular movements intact.   Neck:      Trachea: Phonation normal.   Cardiovascular:      Rate and Rhythm: Regular rhythm. Tachycardia present.   Pulmonary:      Effort: Pulmonary effort is normal.      Breath sounds: Normal breath sounds.   Musculoskeletal:      Cervical back: Neck supple.   Skin:     Coloration: Skin is not jaundiced or pale.   Neurological:      General: No focal deficit present.      Mental Status: She is alert and oriented to person, place, and time.      Motor: Motor function is intact.   Psychiatric:         Attention and Perception: Attention and perception normal.         Mood and Affect: Mood and affect normal.         Speech: Speech " normal.         Behavior: Behavior normal. Behavior is cooperative.         Thought Content: Thought content normal.         Cognition and Memory: Cognition and memory normal.         Judgment: Judgment normal.          Result Review :  The following data was reviewed by: Juju Mcneil MD on 04/16/2025:  COMPLIANCE DRUG ANALYSIS, NO THC - (03/06/2025 10:46)   Office Visit on 06/23/2025   Component Date Value Ref Range Status    Rapid Strep A Screen 06/23/2025 Negative  Negative, VALID, INVALID, Not Performed Final    Internal Control 06/23/2025 Passed  Passed Final    Lot Number 06/23/2025 [id number]   Final    Expiration Date 06/23/2025 03/06/2027   Final    SARS Antigen 06/23/2025 Not Detected  Not Detected, Presumptive Negative Final    Influenza A Antigen GEORGE 06/23/2025 Not Detected  Not Detected Final    Influenza B Antigen GEORGE 06/23/2025 Not Detected  Not Detected Final    Internal Control 06/23/2025 Passed  Passed Final    Lot Number 06/23/2025 [id number]   Final    Expiration Date 06/23/2025 10/11/2025   Final               Assessment and Plan   Diagnoses and all orders for this visit:    1. Sore throat (Primary)  -     POC Rapid Strep A  -     POCT SARS-CoV-2 Antigen GEORGE + Flu    2. Acute non-recurrent pansinusitis  -     amoxicillin-clavulanate (AUGMENTIN) 875-125 MG per tablet; Take 1 tablet by mouth Every 12 (Twelve) Hours for 7 days.  Dispense: 14 tablet; Refill: 0    3. Otitis media without spontaneous rupture of tympanic membrane  -     amoxicillin-clavulanate (AUGMENTIN) 875-125 MG per tablet; Take 1 tablet by mouth Every 12 (Twelve) Hours for 7 days.  Dispense: 14 tablet; Refill: 0    4. Attention deficit hyperactivity disorder (ADHD), unspecified ADHD type  -     lisdexamfetamine (Vyvanse) 60 MG capsule; Take 1 capsule by mouth Every Morning  Dispense: 30 capsule; Refill: 0    5. Binge eating disorder, unspecified severity  -     lisdexamfetamine (Vyvanse) 60 MG capsule; Take 1 capsule by  mouth Every Morning  Dispense: 30 capsule; Refill: 0                  Follow Up   Return in about 3 months (around 9/23/2025) for Controlled Rx Follow Up.  Patient was given instructions and counseling regarding her condition or for health maintenance advice. Please see specific information pulled into the AVS if appropriate.

## 2025-08-04 ENCOUNTER — TRANSCRIBE ORDERS (OUTPATIENT)
Dept: LAB | Facility: HOSPITAL | Age: 30
End: 2025-08-04
Payer: COMMERCIAL

## 2025-08-04 ENCOUNTER — LAB (OUTPATIENT)
Dept: LAB | Facility: HOSPITAL | Age: 30
End: 2025-08-04
Payer: COMMERCIAL

## 2025-08-04 DIAGNOSIS — N63.24 BREAST LUMP ON LEFT SIDE AT 7 O'CLOCK POSITION: Primary | ICD-10-CM

## 2025-08-04 DIAGNOSIS — N63.24 BREAST LUMP ON LEFT SIDE AT 7 O'CLOCK POSITION: ICD-10-CM

## 2025-08-04 LAB
ANION GAP SERPL CALCULATED.3IONS-SCNC: 12 MMOL/L (ref 5–15)
BASOPHILS # BLD AUTO: 0.05 10*3/MM3 (ref 0–0.2)
BASOPHILS NFR BLD AUTO: 1.2 % (ref 0–1.5)
BUN SERPL-MCNC: 7 MG/DL (ref 6–20)
BUN/CREAT SERPL: 9.5 (ref 7–25)
CALCIUM SPEC-SCNC: 9.4 MG/DL (ref 8.6–10.5)
CHLORIDE SERPL-SCNC: 104 MMOL/L (ref 98–107)
CO2 SERPL-SCNC: 21 MMOL/L (ref 22–29)
CREAT SERPL-MCNC: 0.74 MG/DL (ref 0.57–1)
DEPRECATED RDW RBC AUTO: 40.5 FL (ref 37–54)
EGFRCR SERPLBLD CKD-EPI 2021: 113.2 ML/MIN/1.73
EOSINOPHIL # BLD AUTO: 0.1 10*3/MM3 (ref 0–0.4)
EOSINOPHIL NFR BLD AUTO: 2.5 % (ref 0.3–6.2)
ERYTHROCYTE [DISTWIDTH] IN BLOOD BY AUTOMATED COUNT: 12.4 % (ref 12.3–15.4)
GLUCOSE SERPL-MCNC: 74 MG/DL (ref 65–99)
HCT VFR BLD AUTO: 38.6 % (ref 34–46.6)
HGB BLD-MCNC: 13 G/DL (ref 12–15.9)
IMM GRANULOCYTES # BLD AUTO: 0.01 10*3/MM3 (ref 0–0.05)
IMM GRANULOCYTES NFR BLD AUTO: 0.2 % (ref 0–0.5)
LYMPHOCYTES # BLD AUTO: 1.74 10*3/MM3 (ref 0.7–3.1)
LYMPHOCYTES NFR BLD AUTO: 42.8 % (ref 19.6–45.3)
MCH RBC QN AUTO: 30 PG (ref 26.6–33)
MCHC RBC AUTO-ENTMCNC: 33.7 G/DL (ref 31.5–35.7)
MCV RBC AUTO: 88.9 FL (ref 79–97)
MONOCYTES # BLD AUTO: 0.23 10*3/MM3 (ref 0.1–0.9)
MONOCYTES NFR BLD AUTO: 5.7 % (ref 5–12)
NEUTROPHILS NFR BLD AUTO: 1.94 10*3/MM3 (ref 1.7–7)
NEUTROPHILS NFR BLD AUTO: 47.6 % (ref 42.7–76)
NRBC BLD AUTO-RTO: 0 /100 WBC (ref 0–0.2)
PLATELET # BLD AUTO: 285 10*3/MM3 (ref 140–450)
PMV BLD AUTO: 10.5 FL (ref 6–12)
POTASSIUM SERPL-SCNC: 4.3 MMOL/L (ref 3.5–5.2)
RBC # BLD AUTO: 4.34 10*6/MM3 (ref 3.77–5.28)
SODIUM SERPL-SCNC: 137 MMOL/L (ref 136–145)
WBC NRBC COR # BLD AUTO: 4.07 10*3/MM3 (ref 3.4–10.8)

## 2025-08-04 PROCEDURE — 86231 EMA EACH IG CLASS: CPT | Performed by: SURGERY

## 2025-08-04 PROCEDURE — 85025 COMPLETE CBC W/AUTO DIFF WBC: CPT

## 2025-08-04 PROCEDURE — 80048 BASIC METABOLIC PNL TOTAL CA: CPT

## 2025-08-04 PROCEDURE — 86364 TISS TRNSGLTMNASE EA IG CLAS: CPT | Performed by: SURGERY

## 2025-08-04 PROCEDURE — 82784 ASSAY IGA/IGD/IGG/IGM EACH: CPT | Performed by: SURGERY

## 2025-08-06 DIAGNOSIS — F90.9 ATTENTION DEFICIT HYPERACTIVITY DISORDER (ADHD), UNSPECIFIED ADHD TYPE: ICD-10-CM

## 2025-08-06 DIAGNOSIS — F50.819 BINGE EATING DISORDER, UNSPECIFIED SEVERITY: ICD-10-CM

## 2025-08-07 ENCOUNTER — PATIENT MESSAGE (OUTPATIENT)
Dept: INTERNAL MEDICINE | Facility: CLINIC | Age: 30
End: 2025-08-07
Payer: COMMERCIAL

## 2025-08-07 RX ORDER — LISDEXAMFETAMINE DIMESYLATE 60 MG/1
60 CAPSULE ORAL EVERY MORNING
Qty: 30 CAPSULE | Refills: 0 | Status: SHIPPED | OUTPATIENT
Start: 2025-08-07

## 2025-08-08 ENCOUNTER — LAB REQUISITION (OUTPATIENT)
Dept: LAB | Facility: HOSPITAL | Age: 30
End: 2025-08-08
Payer: COMMERCIAL

## 2025-08-08 DIAGNOSIS — N63.24 UNSPECIFIED LUMP IN THE LEFT BREAST, LOWER INNER QUADRANT: ICD-10-CM

## 2025-08-08 PROCEDURE — 88307 TISSUE EXAM BY PATHOLOGIST: CPT | Performed by: SURGERY

## 2025-08-21 LAB
CYTO UR: NORMAL
LAB AP CASE REPORT: NORMAL
LAB AP CLINICAL INFORMATION: NORMAL
LAB AP DIAGNOSIS COMMENT: NORMAL
PATH REPORT.FINAL DX SPEC: NORMAL
PATH REPORT.GROSS SPEC: NORMAL